# Patient Record
Sex: MALE | Race: WHITE | NOT HISPANIC OR LATINO | Employment: OTHER
[De-identification: names, ages, dates, MRNs, and addresses within clinical notes are randomized per-mention and may not be internally consistent; named-entity substitution may affect disease eponyms.]

---

## 2017-02-03 ENCOUNTER — RECORDS - HEALTHEAST (OUTPATIENT)
Dept: ADMINISTRATIVE | Facility: OTHER | Age: 82
End: 2017-02-03

## 2017-02-23 ENCOUNTER — COMMUNICATION - HEALTHEAST (OUTPATIENT)
Dept: INTERNAL MEDICINE | Facility: CLINIC | Age: 82
End: 2017-02-23

## 2017-02-23 ENCOUNTER — OFFICE VISIT - HEALTHEAST (OUTPATIENT)
Dept: INTERNAL MEDICINE | Facility: CLINIC | Age: 82
End: 2017-02-23

## 2017-02-23 DIAGNOSIS — M48.061 SPINAL STENOSIS OF LUMBAR REGION: ICD-10-CM

## 2017-02-23 DIAGNOSIS — Z01.818 PRE-OP EXAM: ICD-10-CM

## 2017-02-23 DIAGNOSIS — Z51.81 MEDICATION MONITORING ENCOUNTER: ICD-10-CM

## 2017-02-23 DIAGNOSIS — I25.84 CORONARY ATHEROSCLEROSIS DUE TO CALCIFIED CORONARY LESION: ICD-10-CM

## 2017-02-23 DIAGNOSIS — I25.10 CORONARY ATHEROSCLEROSIS DUE TO CALCIFIED CORONARY LESION: ICD-10-CM

## 2017-02-23 DIAGNOSIS — C61 PROSTATE CANCER (H): ICD-10-CM

## 2017-02-23 ASSESSMENT — MIFFLIN-ST. JEOR: SCORE: 1799.37

## 2017-02-28 LAB
ATRIAL RATE - MUSE: 59 BPM
DIASTOLIC BLOOD PRESSURE - MUSE: NORMAL MMHG
INTERPRETATION ECG - MUSE: NORMAL
P AXIS - MUSE: 76 DEGREES
PR INTERVAL - MUSE: 268 MS
QRS DURATION - MUSE: 80 MS
QT - MUSE: 380 MS
QTC - MUSE: 376 MS
R AXIS - MUSE: 35 DEGREES
SYSTOLIC BLOOD PRESSURE - MUSE: NORMAL MMHG
T AXIS - MUSE: 51 DEGREES
VENTRICULAR RATE- MUSE: 59 BPM

## 2017-03-16 ENCOUNTER — COMMUNICATION - HEALTHEAST (OUTPATIENT)
Dept: INTERNAL MEDICINE | Facility: CLINIC | Age: 82
End: 2017-03-16

## 2017-03-16 DIAGNOSIS — I10 HYPERTENSION: ICD-10-CM

## 2017-03-16 DIAGNOSIS — E78.00 HYPERCHOLESTEROLEMIA: ICD-10-CM

## 2017-03-16 DIAGNOSIS — N40.0 BPH (BENIGN PROSTATIC HYPERPLASIA): ICD-10-CM

## 2017-04-18 ENCOUNTER — RECORDS - HEALTHEAST (OUTPATIENT)
Dept: ADMINISTRATIVE | Facility: OTHER | Age: 82
End: 2017-04-18

## 2017-04-19 ENCOUNTER — RECORDS - HEALTHEAST (OUTPATIENT)
Dept: ADMINISTRATIVE | Facility: OTHER | Age: 82
End: 2017-04-19

## 2017-04-28 ENCOUNTER — RECORDS - HEALTHEAST (OUTPATIENT)
Dept: ADMINISTRATIVE | Facility: OTHER | Age: 82
End: 2017-04-28

## 2017-05-10 ENCOUNTER — OFFICE VISIT - HEALTHEAST (OUTPATIENT)
Dept: INTERNAL MEDICINE | Facility: CLINIC | Age: 82
End: 2017-05-10

## 2017-05-10 DIAGNOSIS — I25.10 CORONARY ATHEROSCLEROSIS DUE TO CALCIFIED CORONARY LESION: ICD-10-CM

## 2017-05-10 DIAGNOSIS — E78.00 PURE HYPERCHOLESTEROLEMIA: ICD-10-CM

## 2017-05-10 DIAGNOSIS — I10 ESSENTIAL HYPERTENSION: ICD-10-CM

## 2017-05-10 DIAGNOSIS — M48.00 SPINAL STENOSIS: ICD-10-CM

## 2017-05-10 DIAGNOSIS — C61 ADENOCARCINOMA OF PROSTATE (H): ICD-10-CM

## 2017-05-10 DIAGNOSIS — I25.84 CORONARY ATHEROSCLEROSIS DUE TO CALCIFIED CORONARY LESION: ICD-10-CM

## 2017-05-10 DIAGNOSIS — Z85.828 HISTORY OF SQUAMOUS CELL CARCINOMA OF SKIN: ICD-10-CM

## 2017-05-10 RX ORDER — NAPROXEN SODIUM 220 MG
220 TABLET ORAL PRN
Status: SHIPPED | COMMUNITY
Start: 2017-05-10 | End: 2022-04-04

## 2017-05-10 ASSESSMENT — MIFFLIN-ST. JEOR: SCORE: 1785.76

## 2017-06-13 ENCOUNTER — COMMUNICATION - HEALTHEAST (OUTPATIENT)
Dept: ADMINISTRATIVE | Facility: CLINIC | Age: 82
End: 2017-06-13

## 2017-06-23 ENCOUNTER — RECORDS - HEALTHEAST (OUTPATIENT)
Dept: ADMINISTRATIVE | Facility: OTHER | Age: 82
End: 2017-06-23

## 2017-06-28 ENCOUNTER — OFFICE VISIT - HEALTHEAST (OUTPATIENT)
Dept: AUDIOLOGY | Facility: CLINIC | Age: 82
End: 2017-06-28

## 2017-06-28 DIAGNOSIS — H90.3 SENSORINEURAL HEARING LOSS, BILATERAL: ICD-10-CM

## 2017-08-02 ENCOUNTER — OFFICE VISIT - HEALTHEAST (OUTPATIENT)
Dept: AUDIOLOGY | Facility: CLINIC | Age: 82
End: 2017-08-02

## 2017-08-02 DIAGNOSIS — H90.3 SENSORINEURAL HEARING LOSS, BILATERAL: ICD-10-CM

## 2017-08-10 ENCOUNTER — OFFICE VISIT - HEALTHEAST (OUTPATIENT)
Dept: AUDIOLOGY | Facility: CLINIC | Age: 82
End: 2017-08-10

## 2017-08-10 DIAGNOSIS — H90.3 SENSORINEURAL HEARING LOSS, BILATERAL: ICD-10-CM

## 2017-09-13 ENCOUNTER — OFFICE VISIT - HEALTHEAST (OUTPATIENT)
Dept: AUDIOLOGY | Facility: CLINIC | Age: 82
End: 2017-09-13

## 2017-09-13 DIAGNOSIS — H90.3 SENSORINEURAL HEARING LOSS, BILATERAL: ICD-10-CM

## 2017-10-24 ENCOUNTER — RECORDS - HEALTHEAST (OUTPATIENT)
Dept: ADMINISTRATIVE | Facility: OTHER | Age: 82
End: 2017-10-24

## 2017-11-06 ENCOUNTER — COMMUNICATION - HEALTHEAST (OUTPATIENT)
Dept: SURGERY | Facility: CLINIC | Age: 82
End: 2017-11-06

## 2017-11-07 ENCOUNTER — COMMUNICATION - HEALTHEAST (OUTPATIENT)
Dept: AUDIOLOGY | Facility: CLINIC | Age: 82
End: 2017-11-07

## 2017-11-09 ENCOUNTER — COMMUNICATION - HEALTHEAST (OUTPATIENT)
Dept: AUDIOLOGY | Facility: CLINIC | Age: 82
End: 2017-11-09

## 2017-11-13 ENCOUNTER — OFFICE VISIT - HEALTHEAST (OUTPATIENT)
Dept: INTERNAL MEDICINE | Facility: CLINIC | Age: 82
End: 2017-11-13

## 2017-11-13 DIAGNOSIS — M48.061 SPINAL STENOSIS OF LUMBAR REGION, UNSPECIFIED WHETHER NEUROGENIC CLAUDICATION PRESENT: ICD-10-CM

## 2017-11-13 DIAGNOSIS — E78.00 PURE HYPERCHOLESTEROLEMIA: ICD-10-CM

## 2017-11-13 DIAGNOSIS — Z85.828 HISTORY OF SQUAMOUS CELL CARCINOMA OF SKIN: ICD-10-CM

## 2017-11-13 DIAGNOSIS — I25.84 CORONARY ATHEROSCLEROSIS DUE TO CALCIFIED CORONARY LESION: ICD-10-CM

## 2017-11-13 DIAGNOSIS — I25.10 CORONARY ATHEROSCLEROSIS DUE TO CALCIFIED CORONARY LESION: ICD-10-CM

## 2017-11-13 DIAGNOSIS — C61 PROSTATE CANCER (H): ICD-10-CM

## 2017-11-13 DIAGNOSIS — Z51.81 MEDICATION MONITORING ENCOUNTER: ICD-10-CM

## 2017-11-13 LAB
CHOLEST SERPL-MCNC: 126 MG/DL
FASTING STATUS PATIENT QL REPORTED: YES
HDLC SERPL-MCNC: 51 MG/DL
LDLC SERPL CALC-MCNC: 57 MG/DL
TRIGL SERPL-MCNC: 91 MG/DL

## 2017-11-13 ASSESSMENT — MIFFLIN-ST. JEOR: SCORE: 1758.55

## 2017-11-14 ENCOUNTER — COMMUNICATION - HEALTHEAST (OUTPATIENT)
Dept: INTERNAL MEDICINE | Facility: CLINIC | Age: 82
End: 2017-11-14

## 2018-01-24 ENCOUNTER — COMMUNICATION - HEALTHEAST (OUTPATIENT)
Dept: SCHEDULING | Facility: CLINIC | Age: 83
End: 2018-01-24

## 2018-03-02 ENCOUNTER — COMMUNICATION - HEALTHEAST (OUTPATIENT)
Dept: INTERNAL MEDICINE | Facility: CLINIC | Age: 83
End: 2018-03-02

## 2018-03-02 DIAGNOSIS — E78.00 HYPERCHOLESTEROLEMIA: ICD-10-CM

## 2018-03-02 DIAGNOSIS — N40.0 BPH (BENIGN PROSTATIC HYPERPLASIA): ICD-10-CM

## 2018-03-02 DIAGNOSIS — I10 HYPERTENSION: ICD-10-CM

## 2018-04-11 ENCOUNTER — COMMUNICATION - HEALTHEAST (OUTPATIENT)
Dept: INTERNAL MEDICINE | Facility: CLINIC | Age: 83
End: 2018-04-11

## 2018-04-11 ENCOUNTER — OFFICE VISIT - HEALTHEAST (OUTPATIENT)
Dept: INTERNAL MEDICINE | Facility: CLINIC | Age: 83
End: 2018-04-11

## 2018-04-11 DIAGNOSIS — R09.82 POSTNASAL DRIP: ICD-10-CM

## 2018-04-11 DIAGNOSIS — R49.0 HOARSENESS: ICD-10-CM

## 2018-04-13 ENCOUNTER — RECORDS - HEALTHEAST (OUTPATIENT)
Dept: ADMINISTRATIVE | Facility: OTHER | Age: 83
End: 2018-04-13

## 2018-04-24 ENCOUNTER — RECORDS - HEALTHEAST (OUTPATIENT)
Dept: ADMINISTRATIVE | Facility: OTHER | Age: 83
End: 2018-04-24

## 2018-04-24 ENCOUNTER — COMMUNICATION - HEALTHEAST (OUTPATIENT)
Dept: INTERNAL MEDICINE | Facility: CLINIC | Age: 83
End: 2018-04-24

## 2018-04-24 DIAGNOSIS — I25.10 CORONARY ARTERY DISEASE DUE TO CALCIFIED CORONARY LESION: ICD-10-CM

## 2018-04-24 DIAGNOSIS — I25.84 CORONARY ARTERY DISEASE DUE TO CALCIFIED CORONARY LESION: ICD-10-CM

## 2018-04-25 ENCOUNTER — OFFICE VISIT - HEALTHEAST (OUTPATIENT)
Dept: INTERNAL MEDICINE | Facility: CLINIC | Age: 83
End: 2018-04-25

## 2018-04-25 DIAGNOSIS — E78.00 PURE HYPERCHOLESTEROLEMIA: ICD-10-CM

## 2018-04-25 DIAGNOSIS — I25.10 CORONARY ATHEROSCLEROSIS DUE TO CALCIFIED CORONARY LESION: ICD-10-CM

## 2018-04-25 DIAGNOSIS — Z85.828 HISTORY OF SQUAMOUS CELL CARCINOMA OF SKIN: ICD-10-CM

## 2018-04-25 DIAGNOSIS — C61 PROSTATE CANCER (H): ICD-10-CM

## 2018-04-25 DIAGNOSIS — I25.84 CORONARY ATHEROSCLEROSIS DUE TO CALCIFIED CORONARY LESION: ICD-10-CM

## 2018-04-25 DIAGNOSIS — I10 ESSENTIAL HYPERTENSION: ICD-10-CM

## 2018-04-25 ASSESSMENT — MIFFLIN-ST. JEOR: SCORE: 1740.4

## 2018-04-30 ENCOUNTER — RECORDS - HEALTHEAST (OUTPATIENT)
Dept: ADMINISTRATIVE | Facility: OTHER | Age: 83
End: 2018-04-30

## 2018-05-07 ENCOUNTER — RECORDS - HEALTHEAST (OUTPATIENT)
Dept: ADMINISTRATIVE | Facility: OTHER | Age: 83
End: 2018-05-07

## 2018-05-09 ENCOUNTER — RECORDS - HEALTHEAST (OUTPATIENT)
Dept: ADMINISTRATIVE | Facility: OTHER | Age: 83
End: 2018-05-09

## 2018-05-11 ENCOUNTER — AMBULATORY - HEALTHEAST (OUTPATIENT)
Dept: CARDIAC REHAB | Facility: HOSPITAL | Age: 83
End: 2018-05-11

## 2018-05-11 DIAGNOSIS — Z95.5 STENTED CORONARY ARTERY: ICD-10-CM

## 2018-05-12 ENCOUNTER — COMMUNICATION - HEALTHEAST (OUTPATIENT)
Dept: INTERNAL MEDICINE | Facility: CLINIC | Age: 83
End: 2018-05-12

## 2018-05-12 DIAGNOSIS — E78.00 HYPERCHOLESTEROLEMIA: ICD-10-CM

## 2018-05-15 ENCOUNTER — OFFICE VISIT - HEALTHEAST (OUTPATIENT)
Dept: INTERNAL MEDICINE | Facility: CLINIC | Age: 83
End: 2018-05-15

## 2018-05-15 DIAGNOSIS — C61 PROSTATE CANCER (H): ICD-10-CM

## 2018-05-15 DIAGNOSIS — Z51.81 MEDICATION MONITORING ENCOUNTER: ICD-10-CM

## 2018-05-15 DIAGNOSIS — I25.84 CORONARY ATHEROSCLEROSIS DUE TO CALCIFIED CORONARY LESION: ICD-10-CM

## 2018-05-15 DIAGNOSIS — E78.00 PURE HYPERCHOLESTEROLEMIA: ICD-10-CM

## 2018-05-15 DIAGNOSIS — I25.10 CORONARY ATHEROSCLEROSIS DUE TO CALCIFIED CORONARY LESION: ICD-10-CM

## 2018-05-15 LAB
ALBUMIN SERPL-MCNC: 3.6 G/DL (ref 3.5–5)
ALP SERPL-CCNC: 82 U/L (ref 45–120)
ALT SERPL W P-5'-P-CCNC: 15 U/L (ref 0–45)
ANION GAP SERPL CALCULATED.3IONS-SCNC: 8 MMOL/L (ref 5–18)
AST SERPL W P-5'-P-CCNC: 20 U/L (ref 0–40)
BILIRUB SERPL-MCNC: 0.8 MG/DL (ref 0–1)
BUN SERPL-MCNC: 19 MG/DL (ref 8–28)
CALCIUM SERPL-MCNC: 9.5 MG/DL (ref 8.5–10.5)
CHLORIDE BLD-SCNC: 104 MMOL/L (ref 98–107)
CHOLEST SERPL-MCNC: 115 MG/DL
CO2 SERPL-SCNC: 26 MMOL/L (ref 22–31)
CREAT SERPL-MCNC: 1.09 MG/DL (ref 0.7–1.3)
FASTING STATUS PATIENT QL REPORTED: YES
GFR SERPL CREATININE-BSD FRML MDRD: >60 ML/MIN/1.73M2
GLUCOSE BLD-MCNC: 103 MG/DL (ref 70–125)
HDLC SERPL-MCNC: 46 MG/DL
LDLC SERPL CALC-MCNC: 52 MG/DL
POTASSIUM BLD-SCNC: 4.6 MMOL/L (ref 3.5–5)
PROT SERPL-MCNC: 6.6 G/DL (ref 6–8)
SODIUM SERPL-SCNC: 138 MMOL/L (ref 136–145)
TRIGL SERPL-MCNC: 83 MG/DL

## 2018-05-15 ASSESSMENT — MIFFLIN-ST. JEOR: SCORE: 1735.87

## 2018-05-16 ENCOUNTER — COMMUNICATION - HEALTHEAST (OUTPATIENT)
Dept: INTERNAL MEDICINE | Facility: CLINIC | Age: 83
End: 2018-05-16

## 2018-05-16 ENCOUNTER — AMBULATORY - HEALTHEAST (OUTPATIENT)
Dept: CARDIAC REHAB | Facility: HOSPITAL | Age: 83
End: 2018-05-16

## 2018-05-16 DIAGNOSIS — Z95.5 STENTED CORONARY ARTERY: ICD-10-CM

## 2018-05-18 ENCOUNTER — RECORDS - HEALTHEAST (OUTPATIENT)
Dept: ADMINISTRATIVE | Facility: OTHER | Age: 83
End: 2018-05-18

## 2018-05-21 ENCOUNTER — COMMUNICATION - HEALTHEAST (OUTPATIENT)
Dept: INTERNAL MEDICINE | Facility: CLINIC | Age: 83
End: 2018-05-21

## 2018-05-21 ENCOUNTER — AMBULATORY - HEALTHEAST (OUTPATIENT)
Dept: CARDIAC REHAB | Facility: HOSPITAL | Age: 83
End: 2018-05-21

## 2018-05-21 DIAGNOSIS — I10 HYPERTENSION: ICD-10-CM

## 2018-05-21 DIAGNOSIS — N40.0 BPH (BENIGN PROSTATIC HYPERPLASIA): ICD-10-CM

## 2018-05-21 DIAGNOSIS — Z95.5 STENTED CORONARY ARTERY: ICD-10-CM

## 2018-05-23 ENCOUNTER — AMBULATORY - HEALTHEAST (OUTPATIENT)
Dept: CARDIAC REHAB | Facility: HOSPITAL | Age: 83
End: 2018-05-23

## 2018-05-23 DIAGNOSIS — Z95.5 STENTED CORONARY ARTERY: ICD-10-CM

## 2018-05-30 ENCOUNTER — AMBULATORY - HEALTHEAST (OUTPATIENT)
Dept: CARDIAC REHAB | Facility: HOSPITAL | Age: 83
End: 2018-05-30

## 2018-05-30 DIAGNOSIS — Z95.5 STENTED CORONARY ARTERY: ICD-10-CM

## 2018-06-04 ENCOUNTER — AMBULATORY - HEALTHEAST (OUTPATIENT)
Dept: CARDIAC REHAB | Facility: HOSPITAL | Age: 83
End: 2018-06-04

## 2018-06-04 DIAGNOSIS — Z95.5 STENTED CORONARY ARTERY: ICD-10-CM

## 2018-06-06 ENCOUNTER — AMBULATORY - HEALTHEAST (OUTPATIENT)
Dept: CARDIAC REHAB | Facility: HOSPITAL | Age: 83
End: 2018-06-06

## 2018-06-06 DIAGNOSIS — Z95.5 STENTED CORONARY ARTERY: ICD-10-CM

## 2018-06-11 ENCOUNTER — AMBULATORY - HEALTHEAST (OUTPATIENT)
Dept: CARDIAC REHAB | Facility: HOSPITAL | Age: 83
End: 2018-06-11

## 2018-06-11 DIAGNOSIS — Z95.5 STENTED CORONARY ARTERY: ICD-10-CM

## 2018-06-13 ENCOUNTER — AMBULATORY - HEALTHEAST (OUTPATIENT)
Dept: CARDIAC REHAB | Facility: HOSPITAL | Age: 83
End: 2018-06-13

## 2018-06-13 DIAGNOSIS — Z95.5 STENTED CORONARY ARTERY: ICD-10-CM

## 2018-06-18 ENCOUNTER — AMBULATORY - HEALTHEAST (OUTPATIENT)
Dept: CARDIAC REHAB | Facility: HOSPITAL | Age: 83
End: 2018-06-18

## 2018-06-18 ENCOUNTER — RECORDS - HEALTHEAST (OUTPATIENT)
Dept: ADMINISTRATIVE | Facility: OTHER | Age: 83
End: 2018-06-18

## 2018-06-18 DIAGNOSIS — Z95.5 STENTED CORONARY ARTERY: ICD-10-CM

## 2018-06-20 ENCOUNTER — AMBULATORY - HEALTHEAST (OUTPATIENT)
Dept: CARDIAC REHAB | Facility: HOSPITAL | Age: 83
End: 2018-06-20

## 2018-06-20 DIAGNOSIS — Z95.5 STENTED CORONARY ARTERY: ICD-10-CM

## 2018-06-21 ENCOUNTER — RECORDS - HEALTHEAST (OUTPATIENT)
Dept: ADMINISTRATIVE | Facility: OTHER | Age: 83
End: 2018-06-21

## 2018-06-25 ENCOUNTER — AMBULATORY - HEALTHEAST (OUTPATIENT)
Dept: CARDIAC REHAB | Facility: HOSPITAL | Age: 83
End: 2018-06-25

## 2018-06-25 DIAGNOSIS — Z95.5 STENTED CORONARY ARTERY: ICD-10-CM

## 2018-06-27 ENCOUNTER — AMBULATORY - HEALTHEAST (OUTPATIENT)
Dept: CARDIAC REHAB | Facility: HOSPITAL | Age: 83
End: 2018-06-27

## 2018-06-27 DIAGNOSIS — Z95.5 STENTED CORONARY ARTERY: ICD-10-CM

## 2018-07-02 ENCOUNTER — AMBULATORY - HEALTHEAST (OUTPATIENT)
Dept: CARDIAC REHAB | Facility: HOSPITAL | Age: 83
End: 2018-07-02

## 2018-07-02 DIAGNOSIS — Z95.5 STENTED CORONARY ARTERY: ICD-10-CM

## 2018-07-09 ENCOUNTER — AMBULATORY - HEALTHEAST (OUTPATIENT)
Dept: CARDIAC REHAB | Facility: HOSPITAL | Age: 83
End: 2018-07-09

## 2018-07-09 DIAGNOSIS — Z95.5 STENTED CORONARY ARTERY: ICD-10-CM

## 2018-07-11 ENCOUNTER — COMMUNICATION - HEALTHEAST (OUTPATIENT)
Dept: SURGERY | Facility: CLINIC | Age: 83
End: 2018-07-11

## 2018-07-11 ENCOUNTER — AMBULATORY - HEALTHEAST (OUTPATIENT)
Dept: CARDIAC REHAB | Facility: HOSPITAL | Age: 83
End: 2018-07-11

## 2018-07-11 DIAGNOSIS — Z95.5 STENTED CORONARY ARTERY: ICD-10-CM

## 2018-07-13 ENCOUNTER — COMMUNICATION - HEALTHEAST (OUTPATIENT)
Dept: AUDIOLOGY | Facility: CLINIC | Age: 83
End: 2018-07-13

## 2018-07-16 ENCOUNTER — AMBULATORY - HEALTHEAST (OUTPATIENT)
Dept: CARDIAC REHAB | Facility: HOSPITAL | Age: 83
End: 2018-07-16

## 2018-07-16 DIAGNOSIS — Z95.5 STENTED CORONARY ARTERY: ICD-10-CM

## 2018-07-18 ENCOUNTER — AMBULATORY - HEALTHEAST (OUTPATIENT)
Dept: CARDIAC REHAB | Facility: HOSPITAL | Age: 83
End: 2018-07-18

## 2018-07-18 DIAGNOSIS — Z95.5 STENTED CORONARY ARTERY: ICD-10-CM

## 2018-07-19 ENCOUNTER — OFFICE VISIT - HEALTHEAST (OUTPATIENT)
Dept: AUDIOLOGY | Facility: CLINIC | Age: 83
End: 2018-07-19

## 2018-07-19 ENCOUNTER — COMMUNICATION - HEALTHEAST (OUTPATIENT)
Dept: AUDIOLOGY | Facility: CLINIC | Age: 83
End: 2018-07-19

## 2018-07-19 DIAGNOSIS — H90.3 SENSORINEURAL HEARING LOSS, BILATERAL: ICD-10-CM

## 2018-07-23 ENCOUNTER — AMBULATORY - HEALTHEAST (OUTPATIENT)
Dept: CARDIAC REHAB | Facility: HOSPITAL | Age: 83
End: 2018-07-23

## 2018-07-23 DIAGNOSIS — Z95.5 STENTED CORONARY ARTERY: ICD-10-CM

## 2018-07-25 ENCOUNTER — AMBULATORY - HEALTHEAST (OUTPATIENT)
Dept: CARDIAC REHAB | Facility: HOSPITAL | Age: 83
End: 2018-07-25

## 2018-07-25 DIAGNOSIS — Z95.5 STENTED CORONARY ARTERY: ICD-10-CM

## 2018-07-30 ENCOUNTER — AMBULATORY - HEALTHEAST (OUTPATIENT)
Dept: CARDIAC REHAB | Facility: HOSPITAL | Age: 83
End: 2018-07-30

## 2018-07-30 DIAGNOSIS — Z95.5 STENTED CORONARY ARTERY: ICD-10-CM

## 2018-11-15 ENCOUNTER — OFFICE VISIT - HEALTHEAST (OUTPATIENT)
Dept: INTERNAL MEDICINE | Facility: CLINIC | Age: 83
End: 2018-11-15

## 2018-11-15 DIAGNOSIS — M48.061 SPINAL STENOSIS OF LUMBAR REGION, UNSPECIFIED WHETHER NEUROGENIC CLAUDICATION PRESENT: ICD-10-CM

## 2018-11-15 DIAGNOSIS — C61 PROSTATE CANCER (H): ICD-10-CM

## 2018-11-15 DIAGNOSIS — I10 ESSENTIAL HYPERTENSION: ICD-10-CM

## 2018-11-15 DIAGNOSIS — I25.10 CORONARY ATHEROSCLEROSIS DUE TO CALCIFIED CORONARY LESION: ICD-10-CM

## 2018-11-15 DIAGNOSIS — I25.84 CORONARY ATHEROSCLEROSIS DUE TO CALCIFIED CORONARY LESION: ICD-10-CM

## 2018-12-17 ENCOUNTER — RECORDS - HEALTHEAST (OUTPATIENT)
Dept: ADMINISTRATIVE | Facility: OTHER | Age: 83
End: 2018-12-17

## 2018-12-24 ENCOUNTER — RECORDS - HEALTHEAST (OUTPATIENT)
Dept: ADMINISTRATIVE | Facility: OTHER | Age: 83
End: 2018-12-24

## 2019-03-01 ENCOUNTER — COMMUNICATION - HEALTHEAST (OUTPATIENT)
Dept: INTERNAL MEDICINE | Facility: CLINIC | Age: 84
End: 2019-03-01

## 2019-03-01 DIAGNOSIS — R97.20 ELEVATED PROSTATE SPECIFIC ANTIGEN (PSA): ICD-10-CM

## 2019-03-01 DIAGNOSIS — I10 HYPERTENSION: ICD-10-CM

## 2019-03-07 ENCOUNTER — RECORDS - HEALTHEAST (OUTPATIENT)
Dept: ADMINISTRATIVE | Facility: OTHER | Age: 84
End: 2019-03-07

## 2019-03-15 ENCOUNTER — RECORDS - HEALTHEAST (OUTPATIENT)
Dept: ADMINISTRATIVE | Facility: OTHER | Age: 84
End: 2019-03-15

## 2019-04-16 ENCOUNTER — COMMUNICATION - HEALTHEAST (OUTPATIENT)
Dept: ADMINISTRATIVE | Facility: CLINIC | Age: 84
End: 2019-04-16

## 2019-04-24 ENCOUNTER — COMMUNICATION - HEALTHEAST (OUTPATIENT)
Dept: INTERNAL MEDICINE | Facility: CLINIC | Age: 84
End: 2019-04-24

## 2019-04-24 DIAGNOSIS — E78.00 HYPERCHOLESTEROLEMIA: ICD-10-CM

## 2019-05-20 ENCOUNTER — OFFICE VISIT - HEALTHEAST (OUTPATIENT)
Dept: INTERNAL MEDICINE | Facility: CLINIC | Age: 84
End: 2019-05-20

## 2019-05-20 DIAGNOSIS — Z51.81 MEDICATION MONITORING ENCOUNTER: ICD-10-CM

## 2019-05-20 DIAGNOSIS — C61 PROSTATE CANCER (H): ICD-10-CM

## 2019-05-20 DIAGNOSIS — I25.84 CORONARY ATHEROSCLEROSIS DUE TO CALCIFIED CORONARY LESION: ICD-10-CM

## 2019-05-20 DIAGNOSIS — I10 ESSENTIAL HYPERTENSION: ICD-10-CM

## 2019-05-20 DIAGNOSIS — Z00.00 HEALTHCARE MAINTENANCE: ICD-10-CM

## 2019-05-20 DIAGNOSIS — E78.00 PURE HYPERCHOLESTEROLEMIA: ICD-10-CM

## 2019-05-20 DIAGNOSIS — M48.061 SPINAL STENOSIS OF LUMBAR REGION, UNSPECIFIED WHETHER NEUROGENIC CLAUDICATION PRESENT: ICD-10-CM

## 2019-05-20 DIAGNOSIS — Z00.00 ROUTINE GENERAL MEDICAL EXAMINATION AT A HEALTH CARE FACILITY: ICD-10-CM

## 2019-05-20 DIAGNOSIS — I25.10 CORONARY ATHEROSCLEROSIS DUE TO CALCIFIED CORONARY LESION: ICD-10-CM

## 2019-05-20 DIAGNOSIS — Z85.828 HISTORY OF SQUAMOUS CELL CARCINOMA OF SKIN: ICD-10-CM

## 2019-05-20 LAB
ALBUMIN SERPL-MCNC: 3.7 G/DL (ref 3.5–5)
ALP SERPL-CCNC: 77 U/L (ref 45–120)
ALT SERPL W P-5'-P-CCNC: 16 U/L (ref 0–45)
ANION GAP SERPL CALCULATED.3IONS-SCNC: 8 MMOL/L (ref 5–18)
AST SERPL W P-5'-P-CCNC: 19 U/L (ref 0–40)
BILIRUB SERPL-MCNC: 0.7 MG/DL (ref 0–1)
BUN SERPL-MCNC: 21 MG/DL (ref 8–28)
CALCIUM SERPL-MCNC: 9.6 MG/DL (ref 8.5–10.5)
CHLORIDE BLD-SCNC: 106 MMOL/L (ref 98–107)
CHOLEST SERPL-MCNC: 115 MG/DL
CO2 SERPL-SCNC: 25 MMOL/L (ref 22–31)
CREAT SERPL-MCNC: 1.14 MG/DL (ref 0.7–1.3)
ERYTHROCYTE [DISTWIDTH] IN BLOOD BY AUTOMATED COUNT: 13.8 % (ref 11–14.5)
FASTING STATUS PATIENT QL REPORTED: YES
GFR SERPL CREATININE-BSD FRML MDRD: >60 ML/MIN/1.73M2
GLUCOSE BLD-MCNC: 98 MG/DL (ref 70–125)
HCT VFR BLD AUTO: 37.1 % (ref 40–54)
HDLC SERPL-MCNC: 53 MG/DL
HGB BLD-MCNC: 12.2 G/DL (ref 14–18)
LDLC SERPL CALC-MCNC: 46 MG/DL
MCH RBC QN AUTO: 28.8 PG (ref 27–34)
MCHC RBC AUTO-ENTMCNC: 32.8 G/DL (ref 32–36)
MCV RBC AUTO: 88 FL (ref 80–100)
PLATELET # BLD AUTO: 164 THOU/UL (ref 140–440)
PMV BLD AUTO: 7.6 FL (ref 7–10)
POTASSIUM BLD-SCNC: 4.5 MMOL/L (ref 3.5–5)
PROT SERPL-MCNC: 6.4 G/DL (ref 6–8)
RBC # BLD AUTO: 4.22 MILL/UL (ref 4.4–6.2)
SODIUM SERPL-SCNC: 139 MMOL/L (ref 136–145)
TRIGL SERPL-MCNC: 82 MG/DL
WBC: 4.7 THOU/UL (ref 4–11)

## 2019-05-20 ASSESSMENT — MIFFLIN-ST. JEOR: SCORE: 1703.54

## 2019-05-21 ENCOUNTER — COMMUNICATION - HEALTHEAST (OUTPATIENT)
Dept: INTERNAL MEDICINE | Facility: CLINIC | Age: 84
End: 2019-05-21

## 2019-06-14 ENCOUNTER — RECORDS - HEALTHEAST (OUTPATIENT)
Dept: ADMINISTRATIVE | Facility: OTHER | Age: 84
End: 2019-06-14

## 2019-09-17 ENCOUNTER — RECORDS - HEALTHEAST (OUTPATIENT)
Dept: ADMINISTRATIVE | Facility: OTHER | Age: 84
End: 2019-09-17

## 2019-11-04 ENCOUNTER — COMMUNICATION - HEALTHEAST (OUTPATIENT)
Dept: AUDIOLOGY | Facility: CLINIC | Age: 84
End: 2019-11-04

## 2019-11-04 ENCOUNTER — COMMUNICATION - HEALTHEAST (OUTPATIENT)
Dept: SURGERY | Facility: CLINIC | Age: 84
End: 2019-11-04

## 2019-11-07 ENCOUNTER — AMBULATORY - HEALTHEAST (OUTPATIENT)
Dept: AUDIOLOGY | Facility: CLINIC | Age: 84
End: 2019-11-07

## 2019-11-20 ENCOUNTER — COMMUNICATION - HEALTHEAST (OUTPATIENT)
Dept: INTERNAL MEDICINE | Facility: CLINIC | Age: 84
End: 2019-11-20

## 2019-11-20 ENCOUNTER — OFFICE VISIT - HEALTHEAST (OUTPATIENT)
Dept: INTERNAL MEDICINE | Facility: CLINIC | Age: 84
End: 2019-11-20

## 2019-11-20 DIAGNOSIS — Z85.828 HISTORY OF SQUAMOUS CELL CARCINOMA OF SKIN: ICD-10-CM

## 2019-11-20 DIAGNOSIS — I25.84 CORONARY ATHEROSCLEROSIS DUE TO CALCIFIED CORONARY LESION: ICD-10-CM

## 2019-11-20 DIAGNOSIS — E78.00 PURE HYPERCHOLESTEROLEMIA: ICD-10-CM

## 2019-11-20 DIAGNOSIS — I25.10 CORONARY ATHEROSCLEROSIS DUE TO CALCIFIED CORONARY LESION: ICD-10-CM

## 2019-11-20 DIAGNOSIS — C61 PROSTATE CANCER (H): ICD-10-CM

## 2019-11-20 DIAGNOSIS — I10 ESSENTIAL HYPERTENSION: ICD-10-CM

## 2019-11-20 DIAGNOSIS — Z51.81 MEDICATION MONITORING ENCOUNTER: ICD-10-CM

## 2019-11-20 DIAGNOSIS — M48.061 SPINAL STENOSIS OF LUMBAR REGION, UNSPECIFIED WHETHER NEUROGENIC CLAUDICATION PRESENT: ICD-10-CM

## 2019-11-20 LAB
ALBUMIN SERPL-MCNC: 3.8 G/DL (ref 3.5–5)
ALP SERPL-CCNC: 83 U/L (ref 45–120)
ALT SERPL W P-5'-P-CCNC: 19 U/L (ref 0–45)
ANION GAP SERPL CALCULATED.3IONS-SCNC: 9 MMOL/L (ref 5–18)
AST SERPL W P-5'-P-CCNC: 23 U/L (ref 0–40)
BILIRUB SERPL-MCNC: 0.8 MG/DL (ref 0–1)
BUN SERPL-MCNC: 26 MG/DL (ref 8–28)
CALCIUM SERPL-MCNC: 9.7 MG/DL (ref 8.5–10.5)
CHLORIDE BLD-SCNC: 106 MMOL/L (ref 98–107)
CHOLEST SERPL-MCNC: 122 MG/DL
CO2 SERPL-SCNC: 26 MMOL/L (ref 22–31)
CREAT SERPL-MCNC: 1.24 MG/DL (ref 0.7–1.3)
FASTING STATUS PATIENT QL REPORTED: YES
GFR SERPL CREATININE-BSD FRML MDRD: 55 ML/MIN/1.73M2
GLUCOSE BLD-MCNC: 101 MG/DL (ref 70–125)
HDLC SERPL-MCNC: 57 MG/DL
LDLC SERPL CALC-MCNC: 51 MG/DL
POTASSIUM BLD-SCNC: 4.8 MMOL/L (ref 3.5–5)
PROT SERPL-MCNC: 6.7 G/DL (ref 6–8)
SODIUM SERPL-SCNC: 141 MMOL/L (ref 136–145)
TRIGL SERPL-MCNC: 71 MG/DL

## 2019-11-20 RX ORDER — ASPIRIN 325 MG
325 TABLET ORAL EVERY OTHER DAY
Status: SHIPPED | COMMUNITY
Start: 2019-11-20 | End: 2022-09-26

## 2019-12-12 ENCOUNTER — COMMUNICATION - HEALTHEAST (OUTPATIENT)
Dept: INTERNAL MEDICINE | Facility: CLINIC | Age: 84
End: 2019-12-12

## 2019-12-12 DIAGNOSIS — I10 HYPERTENSION: ICD-10-CM

## 2019-12-12 DIAGNOSIS — R97.20 ELEVATED PROSTATE SPECIFIC ANTIGEN (PSA): ICD-10-CM

## 2020-01-09 ENCOUNTER — COMMUNICATION - HEALTHEAST (OUTPATIENT)
Dept: INTERNAL MEDICINE | Facility: CLINIC | Age: 85
End: 2020-01-09

## 2020-01-09 DIAGNOSIS — E78.00 HYPERCHOLESTEROLEMIA: ICD-10-CM

## 2020-01-26 ENCOUNTER — COMMUNICATION - HEALTHEAST (OUTPATIENT)
Dept: INTERNAL MEDICINE | Facility: CLINIC | Age: 85
End: 2020-01-26

## 2020-01-26 DIAGNOSIS — I25.10 CORONARY ATHEROSCLEROSIS DUE TO CALCIFIED CORONARY LESION: ICD-10-CM

## 2020-01-26 DIAGNOSIS — I25.84 CORONARY ATHEROSCLEROSIS DUE TO CALCIFIED CORONARY LESION: ICD-10-CM

## 2020-02-12 ENCOUNTER — COMMUNICATION - HEALTHEAST (OUTPATIENT)
Dept: SURGERY | Facility: CLINIC | Age: 85
End: 2020-02-12

## 2020-02-13 ENCOUNTER — COMMUNICATION - HEALTHEAST (OUTPATIENT)
Dept: AUDIOLOGY | Facility: CLINIC | Age: 85
End: 2020-02-13

## 2020-02-18 ENCOUNTER — COMMUNICATION - HEALTHEAST (OUTPATIENT)
Dept: AUDIOLOGY | Facility: CLINIC | Age: 85
End: 2020-02-18

## 2020-03-12 ENCOUNTER — RECORDS - HEALTHEAST (OUTPATIENT)
Dept: ADMINISTRATIVE | Facility: OTHER | Age: 85
End: 2020-03-12

## 2020-03-20 ENCOUNTER — RECORDS - HEALTHEAST (OUTPATIENT)
Dept: ADMINISTRATIVE | Facility: OTHER | Age: 85
End: 2020-03-20

## 2020-04-14 ENCOUNTER — COMMUNICATION - HEALTHEAST (OUTPATIENT)
Dept: INTERNAL MEDICINE | Facility: CLINIC | Age: 85
End: 2020-04-14

## 2020-04-14 DIAGNOSIS — J30.9 ALLERGIC RHINITIS: ICD-10-CM

## 2020-04-16 ENCOUNTER — RECORDS - HEALTHEAST (OUTPATIENT)
Dept: ADMINISTRATIVE | Facility: OTHER | Age: 85
End: 2020-04-16

## 2020-07-17 ENCOUNTER — RECORDS - HEALTHEAST (OUTPATIENT)
Dept: ADMINISTRATIVE | Facility: OTHER | Age: 85
End: 2020-07-17

## 2020-09-08 ENCOUNTER — RECORDS - HEALTHEAST (OUTPATIENT)
Dept: ADMINISTRATIVE | Facility: OTHER | Age: 85
End: 2020-09-08

## 2020-09-17 ENCOUNTER — OFFICE VISIT - HEALTHEAST (OUTPATIENT)
Dept: INTERNAL MEDICINE | Facility: CLINIC | Age: 85
End: 2020-09-17

## 2020-09-17 DIAGNOSIS — I25.84 CORONARY ATHEROSCLEROSIS DUE TO CALCIFIED CORONARY LESION: ICD-10-CM

## 2020-09-17 DIAGNOSIS — C61 PROSTATE CANCER (H): ICD-10-CM

## 2020-09-17 DIAGNOSIS — I10 HYPERTENSION: ICD-10-CM

## 2020-09-17 DIAGNOSIS — Z51.81 MEDICATION MONITORING ENCOUNTER: ICD-10-CM

## 2020-09-17 DIAGNOSIS — Z00.00 HEALTHCARE MAINTENANCE: ICD-10-CM

## 2020-09-17 DIAGNOSIS — Z00.00 ROUTINE GENERAL MEDICAL EXAMINATION AT A HEALTH CARE FACILITY: ICD-10-CM

## 2020-09-17 DIAGNOSIS — Z85.828 HISTORY OF SQUAMOUS CELL CARCINOMA OF SKIN: ICD-10-CM

## 2020-09-17 DIAGNOSIS — I25.10 CORONARY ATHEROSCLEROSIS DUE TO CALCIFIED CORONARY LESION: ICD-10-CM

## 2020-09-17 LAB
ALBUMIN SERPL-MCNC: 3.9 G/DL (ref 3.5–5)
ALP SERPL-CCNC: 82 U/L (ref 45–120)
ALT SERPL W P-5'-P-CCNC: 16 U/L (ref 0–45)
ANION GAP SERPL CALCULATED.3IONS-SCNC: 3 MMOL/L (ref 5–18)
AST SERPL W P-5'-P-CCNC: 21 U/L (ref 0–40)
BILIRUB SERPL-MCNC: 0.9 MG/DL (ref 0–1)
BUN SERPL-MCNC: 25 MG/DL (ref 8–28)
CALCIUM SERPL-MCNC: 9.6 MG/DL (ref 8.5–10.5)
CHLORIDE BLD-SCNC: 104 MMOL/L (ref 98–107)
CHOLEST SERPL-MCNC: 124 MG/DL
CO2 SERPL-SCNC: 32 MMOL/L (ref 22–31)
CREAT SERPL-MCNC: 1.22 MG/DL (ref 0.7–1.3)
ERYTHROCYTE [DISTWIDTH] IN BLOOD BY AUTOMATED COUNT: 13.8 % (ref 11–14.5)
FASTING STATUS PATIENT QL REPORTED: YES
GFR SERPL CREATININE-BSD FRML MDRD: 56 ML/MIN/1.73M2
GLUCOSE BLD-MCNC: 105 MG/DL (ref 70–125)
HCT VFR BLD AUTO: 41.6 % (ref 40–54)
HDLC SERPL-MCNC: 57 MG/DL
HGB BLD-MCNC: 13.6 G/DL (ref 14–18)
LDLC SERPL CALC-MCNC: 53 MG/DL
MCH RBC QN AUTO: 29.3 PG (ref 27–34)
MCHC RBC AUTO-ENTMCNC: 32.6 G/DL (ref 32–36)
MCV RBC AUTO: 90 FL (ref 80–100)
PLATELET # BLD AUTO: 152 THOU/UL (ref 140–440)
PMV BLD AUTO: 7.6 FL (ref 7–10)
POTASSIUM BLD-SCNC: 4.9 MMOL/L (ref 3.5–5)
PROT SERPL-MCNC: 6.8 G/DL (ref 6–8)
RBC # BLD AUTO: 4.62 MILL/UL (ref 4.4–6.2)
SODIUM SERPL-SCNC: 139 MMOL/L (ref 136–145)
TRIGL SERPL-MCNC: 72 MG/DL
WBC: 3.8 THOU/UL (ref 4–11)

## 2020-09-17 ASSESSMENT — MIFFLIN-ST. JEOR: SCORE: 1706.5

## 2020-09-30 ENCOUNTER — COMMUNICATION - HEALTHEAST (OUTPATIENT)
Dept: AUDIOLOGY | Facility: CLINIC | Age: 85
End: 2020-09-30

## 2020-10-07 ENCOUNTER — COMMUNICATION - HEALTHEAST (OUTPATIENT)
Dept: AUDIOLOGY | Facility: CLINIC | Age: 85
End: 2020-10-07

## 2020-10-18 ENCOUNTER — AMBULATORY - HEALTHEAST (OUTPATIENT)
Dept: NURSING | Facility: CLINIC | Age: 85
End: 2020-10-18

## 2020-10-27 ENCOUNTER — AMBULATORY - HEALTHEAST (OUTPATIENT)
Dept: AUDIOLOGY | Facility: CLINIC | Age: 85
End: 2020-10-27

## 2020-11-12 ENCOUNTER — COMMUNICATION - HEALTHEAST (OUTPATIENT)
Dept: INTERNAL MEDICINE | Facility: CLINIC | Age: 85
End: 2020-11-12

## 2020-11-12 ENCOUNTER — OFFICE VISIT - HEALTHEAST (OUTPATIENT)
Dept: AUDIOLOGY | Facility: CLINIC | Age: 85
End: 2020-11-12

## 2020-11-12 DIAGNOSIS — H90.3 SENSORINEURAL HEARING LOSS, BILATERAL: ICD-10-CM

## 2020-11-12 DIAGNOSIS — R97.20 ELEVATED PROSTATE SPECIFIC ANTIGEN (PSA): ICD-10-CM

## 2020-11-12 DIAGNOSIS — I10 HYPERTENSION: ICD-10-CM

## 2020-12-01 ENCOUNTER — COMMUNICATION - HEALTHEAST (OUTPATIENT)
Dept: AUDIOLOGY | Facility: CLINIC | Age: 85
End: 2020-12-01

## 2020-12-09 ENCOUNTER — RECORDS - HEALTHEAST (OUTPATIENT)
Dept: ADMINISTRATIVE | Facility: OTHER | Age: 85
End: 2020-12-09

## 2021-02-01 ENCOUNTER — COMMUNICATION - HEALTHEAST (OUTPATIENT)
Dept: INTERNAL MEDICINE | Facility: CLINIC | Age: 86
End: 2021-02-01

## 2021-02-01 DIAGNOSIS — I10 HYPERTENSION: ICD-10-CM

## 2021-02-01 DIAGNOSIS — R97.20 ELEVATED PROSTATE SPECIFIC ANTIGEN (PSA): ICD-10-CM

## 2021-02-02 RX ORDER — TAMSULOSIN HYDROCHLORIDE 0.4 MG/1
CAPSULE ORAL
Qty: 90 CAPSULE | Refills: 1 | Status: SHIPPED | OUTPATIENT
Start: 2021-02-02 | End: 2022-03-16

## 2021-02-03 ENCOUNTER — OFFICE VISIT - HEALTHEAST (OUTPATIENT)
Dept: INTERNAL MEDICINE | Facility: CLINIC | Age: 86
End: 2021-02-03

## 2021-02-03 DIAGNOSIS — I25.10 CORONARY ATHEROSCLEROSIS DUE TO CALCIFIED CORONARY LESION: ICD-10-CM

## 2021-02-03 DIAGNOSIS — R22.42 LOCALIZED SWELLING OF LEFT LOWER LEG: ICD-10-CM

## 2021-02-03 DIAGNOSIS — I25.84 CORONARY ATHEROSCLEROSIS DUE TO CALCIFIED CORONARY LESION: ICD-10-CM

## 2021-02-19 ENCOUNTER — AMBULATORY - HEALTHEAST (OUTPATIENT)
Dept: NURSING | Facility: CLINIC | Age: 86
End: 2021-02-19

## 2021-02-24 ENCOUNTER — COMMUNICATION - HEALTHEAST (OUTPATIENT)
Dept: INTERNAL MEDICINE | Facility: CLINIC | Age: 86
End: 2021-02-24

## 2021-02-24 DIAGNOSIS — E78.00 HYPERCHOLESTEROLEMIA: ICD-10-CM

## 2021-02-24 RX ORDER — ATORVASTATIN CALCIUM 20 MG
20 TABLET ORAL DAILY
Qty: 90 TABLET | Refills: 3 | Status: SHIPPED | OUTPATIENT
Start: 2021-02-24 | End: 2021-12-07

## 2021-02-25 ENCOUNTER — COMMUNICATION - HEALTHEAST (OUTPATIENT)
Dept: INTERNAL MEDICINE | Facility: CLINIC | Age: 86
End: 2021-02-25

## 2021-02-25 DIAGNOSIS — I25.84 CORONARY ATHEROSCLEROSIS DUE TO CALCIFIED CORONARY LESION: ICD-10-CM

## 2021-02-25 DIAGNOSIS — I25.10 CORONARY ATHEROSCLEROSIS DUE TO CALCIFIED CORONARY LESION: ICD-10-CM

## 2021-03-12 ENCOUNTER — AMBULATORY - HEALTHEAST (OUTPATIENT)
Dept: NURSING | Facility: CLINIC | Age: 86
End: 2021-03-12

## 2021-03-26 ENCOUNTER — OFFICE VISIT - HEALTHEAST (OUTPATIENT)
Dept: INTERNAL MEDICINE | Facility: CLINIC | Age: 86
End: 2021-03-26

## 2021-03-26 DIAGNOSIS — C61 PROSTATE CANCER (H): ICD-10-CM

## 2021-03-26 DIAGNOSIS — I25.10 CORONARY ATHEROSCLEROSIS DUE TO CALCIFIED CORONARY LESION: ICD-10-CM

## 2021-03-26 DIAGNOSIS — G89.29 CHRONIC BILATERAL LOW BACK PAIN WITHOUT SCIATICA: ICD-10-CM

## 2021-03-26 DIAGNOSIS — Z51.81 ENCOUNTER FOR THERAPEUTIC DRUG MONITORING: ICD-10-CM

## 2021-03-26 DIAGNOSIS — I25.84 CORONARY ATHEROSCLEROSIS DUE TO CALCIFIED CORONARY LESION: ICD-10-CM

## 2021-03-26 DIAGNOSIS — R60.0 BILATERAL LOWER EXTREMITY EDEMA: ICD-10-CM

## 2021-03-26 DIAGNOSIS — M54.50 CHRONIC BILATERAL LOW BACK PAIN WITHOUT SCIATICA: ICD-10-CM

## 2021-03-26 LAB
ALBUMIN SERPL-MCNC: 3.9 G/DL (ref 3.5–5)
ALP SERPL-CCNC: 91 U/L (ref 45–120)
ALT SERPL W P-5'-P-CCNC: 12 U/L (ref 0–45)
ANION GAP SERPL CALCULATED.3IONS-SCNC: 8 MMOL/L (ref 5–18)
AST SERPL W P-5'-P-CCNC: 21 U/L (ref 0–40)
BILIRUB SERPL-MCNC: 0.8 MG/DL (ref 0–1)
BUN SERPL-MCNC: 23 MG/DL (ref 8–28)
CALCIUM SERPL-MCNC: 9.2 MG/DL (ref 8.5–10.5)
CHLORIDE BLD-SCNC: 106 MMOL/L (ref 98–107)
CO2 SERPL-SCNC: 25 MMOL/L (ref 22–31)
CREAT SERPL-MCNC: 1.07 MG/DL (ref 0.7–1.3)
GFR SERPL CREATININE-BSD FRML MDRD: >60 ML/MIN/1.73M2
GLUCOSE BLD-MCNC: 106 MG/DL (ref 70–125)
POTASSIUM BLD-SCNC: 4.8 MMOL/L (ref 3.5–5)
PROT SERPL-MCNC: 6.6 G/DL (ref 6–8)
SODIUM SERPL-SCNC: 139 MMOL/L (ref 136–145)

## 2021-03-26 ASSESSMENT — MIFFLIN-ST. JEOR: SCORE: 1722.83

## 2021-03-29 ENCOUNTER — COMMUNICATION - HEALTHEAST (OUTPATIENT)
Dept: INTERNAL MEDICINE | Facility: CLINIC | Age: 86
End: 2021-03-29

## 2021-04-19 ENCOUNTER — OFFICE VISIT - HEALTHEAST (OUTPATIENT)
Dept: INTERNAL MEDICINE | Facility: CLINIC | Age: 86
End: 2021-04-19

## 2021-04-19 DIAGNOSIS — R60.0 BILATERAL LOWER EXTREMITY EDEMA: ICD-10-CM

## 2021-04-19 DIAGNOSIS — I25.10 CORONARY ATHEROSCLEROSIS DUE TO CALCIFIED CORONARY LESION: ICD-10-CM

## 2021-04-19 DIAGNOSIS — Z85.46 HISTORY OF PROSTATE CANCER: ICD-10-CM

## 2021-04-19 DIAGNOSIS — Z51.81 ENCOUNTER FOR THERAPEUTIC DRUG MONITORING: ICD-10-CM

## 2021-04-19 DIAGNOSIS — I25.84 CORONARY ATHEROSCLEROSIS DUE TO CALCIFIED CORONARY LESION: ICD-10-CM

## 2021-04-19 LAB
ANION GAP SERPL CALCULATED.3IONS-SCNC: 10 MMOL/L (ref 5–18)
BUN SERPL-MCNC: 24 MG/DL (ref 8–28)
CALCIUM SERPL-MCNC: 9.3 MG/DL (ref 8.5–10.5)
CHLORIDE BLD-SCNC: 106 MMOL/L (ref 98–107)
CO2 SERPL-SCNC: 24 MMOL/L (ref 22–31)
CREAT SERPL-MCNC: 1.13 MG/DL (ref 0.7–1.3)
GFR SERPL CREATININE-BSD FRML MDRD: >60 ML/MIN/1.73M2
GLUCOSE BLD-MCNC: 104 MG/DL (ref 70–125)
POTASSIUM BLD-SCNC: 4.8 MMOL/L (ref 3.5–5)
SODIUM SERPL-SCNC: 140 MMOL/L (ref 136–145)

## 2021-04-19 RX ORDER — FUROSEMIDE 20 MG
20 TABLET ORAL DAILY
Qty: 90 TABLET | Refills: 2 | Status: SHIPPED | OUTPATIENT
Start: 2021-04-19 | End: 2021-12-07

## 2021-04-20 ENCOUNTER — COMMUNICATION - HEALTHEAST (OUTPATIENT)
Dept: INTERNAL MEDICINE | Facility: CLINIC | Age: 86
End: 2021-04-20

## 2021-05-07 ENCOUNTER — COMMUNICATION - HEALTHEAST (OUTPATIENT)
Dept: INTERNAL MEDICINE | Facility: CLINIC | Age: 86
End: 2021-05-07

## 2021-05-07 DIAGNOSIS — I25.10 CORONARY ATHEROSCLEROSIS DUE TO CALCIFIED CORONARY LESION: ICD-10-CM

## 2021-05-07 DIAGNOSIS — I25.84 CORONARY ATHEROSCLEROSIS DUE TO CALCIFIED CORONARY LESION: ICD-10-CM

## 2021-05-10 RX ORDER — NITROGLYCERIN 0.4 MG/1
TABLET SUBLINGUAL
Qty: 50 TABLET | Refills: 3 | Status: SHIPPED | OUTPATIENT
Start: 2021-05-10 | End: 2022-07-07

## 2021-05-27 NOTE — TELEPHONE ENCOUNTER
Please send pt rechargeable hearing aid batteries to:  [address]  [facility] [address]

## 2021-05-28 NOTE — PROGRESS NOTES
Assessment and Plan:       1. Healthcare maintenance  Continue walking on treadmill 4 times a week or more.    History of colon polyp, 5 years since his last colonoscopy.  His bowels are normal and no blood in stool.  With history of MI 1 year ago and multiple drug-eluting stents and age of 85, I did discuss with patient not proceeding with colonoscopy.  I did discuss risk of undiagnosed colon cancer.  Patient accepts this risk and he does not wish to proceed with colonoscopy screening at this time anymore.    He was reminded to make his routine yearly ophthalmology appointment in August.    Discussed CODE STATUS, still full code, but he would not want prolonged artificial life support if catastrophic event with poor prognosis.  He has discussed this with family.    Follow-up with me in 6 months for routine follow-up of multiple medical issues below.    2. Coronary atherosclerosis due to calcified coronary lesion  Asymptomatic with good exercise tolerance on treadmill.    Is one year since his non-Q wave MI with drug-eluting stents placed.  He should be able to come off the Effient at this time.  I did discuss with him discontinuation of Effient and continuation of aspirin.    He wants to discuss this with cardiology but does not have an appointment set up yet.  Referral is been placed for him to make an appointment.    He will continue on Lipitor 20 mg.  - Ambulatory referral to Cardiology    3. Essential hypertension  Controlled.  Blood pressure usually running in the 120s over 60s for him at home but no orthostasis.  Continue on atenolol 25 mg a day.    4. Prostate cancer (H)  I did review the biopsy report from March 2019.  It was a 3+3 over relatively small area.  That stable.  His PSA was only up slightly to 9.08 in December.  6-month follow-up PSA check this October, continue watchful waiting.  Urology manages prostate.    Urinating adequately with Flomax.    5. Spinal stenosis of lumbar region, unspecified  whether neurogenic claudication present  Decompressive laminectomy in 2017 at L2-L5.  Continue regular walking on treadmill.  Uses cane.  No falls.  No pain medicine needed.    6. Essential Hypercholesterolemia  Lipitor 20 mg.  Goal LDL less than 80 with coronary disease.  No sign of myopathy.  - Lipid Cascade    7. Medication monitoring encounter    - Comprehensive Metabolic Panel  - HM2(CBC w/o Differential)    8. History of squamous cell carcinoma of skin  Has an appoint with dermatology in June.  Sclerotic lesion on scalp discussed with patient he will discuss that with cardiology.    9. Routine general medical examination at a health care facility  Mild lower extremity edema consistent with venous insufficiency at trace level, continues.  I did discuss option for compression stockings patient can wear.  Follow-up if that worsens.    History of mild COPD, no new cough or increasing shortness of breath.    The patient's current medical problems were reviewed.    I have had an Advance Directives discussion with the patient.  The following health maintenance schedule was reviewed with the patient and provided in printed form in the after visit summary:   Health Maintenance   Topic Date Due     ZOSTER VACCINES (2 of 3) 03/18/2009     FALL RISK ASSESSMENT  04/25/2019     ADVANCE DIRECTIVES DISCUSSED WITH PATIENT  02/22/2022     TD 18+ HE  07/25/2022     PNEUMOCOCCAL POLYSACCHARIDE VACCINE AGE 65 AND OVER  Completed     INFLUENZA VACCINE RULE BASED  Completed     PNEUMOCOCCAL CONJUGATE VACCINE FOR ADULTS (PCV13 OR PREVNAR)  Completed        Subjective:   Chief Complaint: Scott Cehrry is an 85 y.o. male here for an Annual Wellness visit.   HPI: 85-year-old male here for health maintenance physical exam.    He also has a past history of coronary artery disease.  Non-Q wave MI April 2018 with 2 drug-eluting stents of the LAD, one BRITTANEY of the PDA.  One BRITTANEY to the circumflex.  PTCA of the diagonal and OM.    No chest  pain or exertional symptoms since then.    May 2018 echo with ejection fraction 55 days 60%.  Mild aortic insufficiency.  Grade 1 diastolic dysfunction.    He does have some mild lower extremity edema but that stable at trace level.  No worsening shortness of breath.  He has mild COPD.  No longer a smoker.    No new cough or change in cough.  No mouth sores or swallowing difficulty.    His blood pressure at home is been in the 120s over 60s with no orthostasis.  On atenolol 25 mill grams a day.    I did review the oncology notes from December in March.  PSA stable as above.  Biopsy 3+3 and stable, as noted above.    History of skin cancer, he had a sclerotic area on his scalp that he has been scratching and otherwise has not noticed new skin lesions.  He does have an appointment next month with his dermatologist.    His bowels are normal.  He did have a previous colon polyp.  5-year follow-up was due.    No vision changes or new headaches.  His yearly eye check will be due in August.    No falls.    Only rare alcohol.    Lives independently at home with wife.  No mood or anxiety issues or memory problems.    Review of Systems:    Please see above.  The rest of the review of systems are negative for all systems.    Patient Care Team:  Alejandro Redd MD as PCP - General     Patient Active Problem List   Diagnosis     Foot Pain (Soft Tissue)     Essential Hypercholesterolemia     Coronary Artery Disease     Serology Prostate-specific Antigen (PSA) Elevated     Cough     Acute Gingivitis     Aphthous Ulcer     Benign Adenomatous Polyp Of The Large Intestine     Unspecified cataract     Prostate cancer (H)     Spinal stenosis, lumbar     Medication monitoring encounter     History of squamous cell carcinoma of skin     Sensorineural hearing loss, bilateral     Past Medical History:   Diagnosis Date     Adenomatous colon polyp     in 2014, 5 year follow up     BPH (benign prostatic hyperplasia)     on flomax     CAD  (coronary artery disease)     BMS LAD '01/ neg stress test '07/ no h/o MI     COPD (chronic obstructive pulmonary disease) (H)     mild/ former smoker     History of basal cell carcinoma of skin     yearly exam in Sept.     HTN (hypertension)      Hypercholesteremia     controlled on lipitor     Prostate cancer (H)     heriberto 3+3 on 4/14 bx.  No Tx yet, watchful waiting.      Spinal stenosis     severe L3-4, mod L4-5      No past surgical history on file.   No family history on file.   Social History     Socioeconomic History     Marital status:      Spouse name: Not on file     Number of children: Not on file     Years of education: Not on file     Highest education level: Not on file   Occupational History     Not on file   Social Needs     Financial resource strain: Not on file     Food insecurity:     Worry: Not on file     Inability: Not on file     Transportation needs:     Medical: Not on file     Non-medical: Not on file   Tobacco Use     Smoking status: Former Smoker     Smokeless tobacco: Never Used   Substance and Sexual Activity     Alcohol use: Not on file     Drug use: Not on file     Sexual activity: Not on file   Lifestyle     Physical activity:     Days per week: Not on file     Minutes per session: Not on file     Stress: Not on file   Relationships     Social connections:     Talks on phone: Not on file     Gets together: Not on file     Attends Taoist service: Not on file     Active member of club or organization: Not on file     Attends meetings of clubs or organizations: Not on file     Relationship status: Not on file     Intimate partner violence:     Fear of current or ex partner: Not on file     Emotionally abused: Not on file     Physically abused: Not on file     Forced sexual activity: Not on file   Other Topics Concern     Not on file   Social History Narrative     Not on file      Current Outpatient Medications   Medication Sig Dispense Refill     ascorbic acid, vitamin C,  "(ASCORBIC ACID WITH HERMILA HIPS) 500 MG tablet Take 500 mg by mouth daily.       aspirin 81 MG EC tablet Take 81 mg by mouth.       atenolol (TENORMIN) 25 MG tablet TAKE 1 TABLET BY MOUTH  DAILY 90 tablet 2     cholecalciferol, vitamin D3, (VITAMIN D3) 1,000 unit capsule Take 1,000 Units by mouth daily.              fish oil-dha-epa 1,200-144-216 mg cap Take 1,000 mg by mouth daily .             fluticasone (FLONASE) 50 mcg/actuation nasal spray Use 1 spray nasally two  times daily as needed for  hay fever 48 g 11     FOLIC ACID/MULTIVITS-MIN/LUT (CENTRUM SILVER ORAL) Take 1 tablet by mouth daily.       LIPITOR 20 mg tablet TAKE 1 TABLET BY MOUTH  DAILY 90 tablet 2     naproxen sodium (ALEVE) 220 MG tablet Take 220 mg by mouth as needed .             nitroglycerin (NITROSTAT) 0.4 MG SL tablet Place 0.4 mg under the tongue.       prasugrel (EFFIENT) 10 mg Tab tablet Take 10 mg by mouth daily.              tamsulosin (FLOMAX) 0.4 mg cap TAKE 1 CAPSULE BY MOUTH AT  BEDTIME 90 capsule 2     zinc 50 mg Tab Take 1 tablet by mouth daily.       No current facility-administered medications for this visit.       Objective:   Vital Signs:   Visit Vitals  /76 (Patient Site: Right Arm, Patient Position: Sitting, Cuff Size: Adult Large)   Pulse 60   Resp 12   Ht 5' 9.25\" (1.759 m)   Wt (!) 228 lb (103.4 kg)   BMI 33.43 kg/m         VisionScreening:  No exam data present     PHYSICAL EXAM  Alert and oriented x3 with normal mood and affect.  Pupils and irises equal and reactive.  Extraocular muscles intact.  No jaundice or conjunctivitis.  Tympanic membranes are normal.  No cerumen impaction.  Normal pharynx.  No pharyngitis.  No oral lesions or leukoplakia.  Teeth in good condition.  No cervical or supraclavicular adenopathy.  No JVD and no carotid bruits.  No thyromegaly or nodularity.  Lungs are clear to auscultation with normal respiratory excursion.  Heart is regular with no murmur.  Trace ankle edema bilaterally.  +1 " pedal pulses.  Feet in good condition.  Gait within normal limits and able to clamp on the exam table.  Abdomen is nontender, just mildly overweight.  No hepatospleno megaly and no pulsatile mass.   exam was done by his urologist recently.  Skin exam shows a sclerotic area in the scalp that he has been scratching it, no other suspicious lesions.  A number of seborrheic keratosis.    Assessment Results 5/20/2019   Activities of Daily Living No help needed   Instrumental Activities of Daily Living No help needed   Mini Cog Total Score 5   Some recent data might be hidden     A Mini-Cog score of 0-2 suggests the possibility of dementia, score of 3-5 suggests no dementia    Identified Health Risks:     Information regarding advance directives (living sims), including where he can download the appropriate form, was provided to the patient via the AVS.

## 2021-05-28 NOTE — TELEPHONE ENCOUNTER
Patient calling requesting an update re: hearing aids.     Please call patient on his cell phone with an update if they have arrived at: [phone]    OK to leave detailed message

## 2021-05-28 NOTE — TELEPHONE ENCOUNTER
Refill Approved    Rx renewed per Medication Renewal Policy. Medication was last renewed on 5/12/18  #90 R-3.    Last OV 11/15/18    Peg Kebede, [facility] Triage/Med Refill 4/26/2019     Requested Prescriptions   Pending Prescriptions Disp Refills     LIPITOR 20 mg tablet [Pharmacy Med Name: LIPITOR  20MG  TAB] 90 tablet 3     Sig: TAKE 1 TABLET BY MOUTH  DAILY       Statins Refill Protocol (Hmg CoA Reductase Inhibitors) Passed - 4/24/2019  3:37 AM        Passed - PCP or prescribing provider visit in past 12 months      Last office visit with prescriber/PCP: 11/15/2018 Alejandro Redd MD OR same dept: 11/15/2018 Alejandro Redd MD OR same specialty: 11/15/2018 Alejandro Redd MD  Last physical: 2/23/2017 Last MTM visit: Visit date not found   Next visit within 3 mo: Visit date not found  Next physical within 3 mo: Visit date not found  Prescriber OR PCP: Alejandro Redd MD  Last diagnosis associated with med order: 1. Hypercholesterolemia  - LIPITOR 20 mg tablet [Pharmacy Med Name: LIPITOR  20MG  TAB]; TAKE 1 TABLET BY MOUTH  DAILY  Dispense: 90 tablet; Refill: 3    If protocol passes may refill for 12 months if within 3 months of last provider visit (or a total of 15 months).

## 2021-05-30 VITALS — WEIGHT: 250 LBS | BODY MASS INDEX: 37.03 KG/M2 | HEIGHT: 69 IN

## 2021-05-31 VITALS — HEIGHT: 69 IN | BODY MASS INDEX: 36.58 KG/M2 | WEIGHT: 247 LBS

## 2021-05-31 VITALS — BODY MASS INDEX: 35.7 KG/M2 | WEIGHT: 241 LBS | HEIGHT: 69 IN

## 2021-06-01 VITALS — BODY MASS INDEX: 33.97 KG/M2 | WEIGHT: 230 LBS

## 2021-06-01 VITALS — WEIGHT: 235 LBS | BODY MASS INDEX: 34.7 KG/M2

## 2021-06-01 VITALS — HEIGHT: 69 IN | BODY MASS INDEX: 35.1 KG/M2 | WEIGHT: 237 LBS

## 2021-06-01 VITALS — WEIGHT: 229 LBS | BODY MASS INDEX: 33.82 KG/M2

## 2021-06-01 VITALS — WEIGHT: 242 LBS | BODY MASS INDEX: 35.74 KG/M2

## 2021-06-01 VITALS — WEIGHT: 225 LBS | BODY MASS INDEX: 33.23 KG/M2

## 2021-06-01 VITALS — WEIGHT: 234 LBS | BODY MASS INDEX: 34.56 KG/M2

## 2021-06-01 VITALS — WEIGHT: 230 LBS | BODY MASS INDEX: 33.97 KG/M2

## 2021-06-01 VITALS — HEIGHT: 69 IN | WEIGHT: 236 LBS | BODY MASS INDEX: 34.96 KG/M2

## 2021-06-01 VITALS — BODY MASS INDEX: 33.37 KG/M2 | WEIGHT: 226 LBS

## 2021-06-01 VITALS — BODY MASS INDEX: 33.23 KG/M2 | WEIGHT: 225 LBS

## 2021-06-01 VITALS — WEIGHT: 237 LBS | BODY MASS INDEX: 35 KG/M2

## 2021-06-01 VITALS — WEIGHT: 233 LBS | BODY MASS INDEX: 34.41 KG/M2

## 2021-06-01 VITALS — BODY MASS INDEX: 34.41 KG/M2 | WEIGHT: 233 LBS

## 2021-06-01 VITALS — BODY MASS INDEX: 33.52 KG/M2 | WEIGHT: 227 LBS

## 2021-06-01 VITALS — WEIGHT: 236 LBS | BODY MASS INDEX: 34.85 KG/M2

## 2021-06-01 VITALS — BODY MASS INDEX: 34.26 KG/M2 | WEIGHT: 232 LBS

## 2021-06-01 VITALS — BODY MASS INDEX: 34.11 KG/M2 | WEIGHT: 231 LBS

## 2021-06-02 VITALS — WEIGHT: 225.9 LBS | BODY MASS INDEX: 33.36 KG/M2

## 2021-06-03 VITALS — WEIGHT: 228 LBS | BODY MASS INDEX: 33.77 KG/M2 | HEIGHT: 69 IN

## 2021-06-03 NOTE — TELEPHONE ENCOUNTER
Rechargeable batteries are not lasting more than 8 hours per day. [employer] will send replacement batteries under warranty at no charge.    Batteries will be mailed to Scott Cherry's home once received from .    Ramin aGrcia.  Clinical Audiologist  [place] #[id number]

## 2021-06-03 NOTE — PROGRESS NOTES
A pair of [employer] rechargeable 312 batteries mailed to patient via certified mail. Tracking #: [id number].     Ramin Prieto, CCC-A  [place] Licensed Audiologist #[id number]

## 2021-06-03 NOTE — PATIENT INSTRUCTIONS - HE
Continue current medications.    Continue with regular walking.    Nitroglycerin was refilled, but if you do have new chest pain or start taking nitroglycerin, seek medical attention immediately.    Routine follow-up in 6 months for adult wellness visit.

## 2021-06-03 NOTE — PROGRESS NOTES
[facility] Follow Up Note    Scott Cherry   85 y.o. male    Date of Visit: 11/20/2019    Chief Complaint   Patient presents with     Follow-up     Subjective  Ron is here for routine follow-up of multiple medical problems.    Coronary artery disease with a non-Q wave MI April 2018.  2 drug-eluting stents in the LAD, 1 BRITTANEY in the PDA.  1 BRITTANEY and circumflex.  Also PTCA of OM and diagonal.  No recurrent chest pain since.    Good exertional ability with doing the treadmill 4 times a week.    No palpitations or history of arrhythmia.    No lightheaded dizzy spells.  His blood pressure is usually well controlled on his checks.  On atenolol 25 mg a day.    No generalized myalgias on Lipitor 20 mg.  May 2019 labs reviewed with normal creatinine and liver test.  Normal blood sugar.  LDL 46 and HDL 53.    May 2018 echo reviewed with ejection fraction 55-60%.  Mild aortic insufficiency.  Grade 1 diastolic dysfunction.    Chronic lower extremity edema at trace levels, stable.  No increasing shortness of breath.    He is no longer on the Effient.  That was discontinued by cardiology in June.  Given a 1 year follow-up with cardiology.    Continues on aspirin daily although is alternating the dose every other day.  No epigastric pain or bleeding issues.    Mild COPD, ex-smoker.  No new cough.    Prostate cancer 3+3 on biopsy in March of this year.  On watchful waiting plan.  Urinating adequately with Flomax.  I did review labs from September 2019 with a PSA up to 11.0.  It was 9.0 last December.  He has a follow-up appointment with urology in February.    History of laminectomy in 2017 of L2-5.  No radicular pain.  He is walking regularly on the treadmill as above.    He did have recurrent squamous cell cancer of the scalp in June with Mohs surgery.  That is healed up now.  He has a follow-up appointment next month with dermatology.    He plans to see ophthalmology next month for routine checkup.    No  abdominal pain complaints.    Continues to live independently with wife.    PMHx:    Past Medical History:   Diagnosis Date     Adenomatous colon polyp     in 2014, 5 year follow up     BPH (benign prostatic hyperplasia)     on flomax     CAD (coronary artery disease)     BMS LAD '01/ neg stress test '07/ no h/o MI     COPD (chronic obstructive pulmonary disease) (H)     mild/ former smoker     History of basal cell carcinoma of skin     yearly exam in Sept.     HTN (hypertension)      Hypercholesteremia     controlled on lipitor     Prostate cancer (H)     heriberto 3+3 on 4/14 bx.  No Tx yet, watchful waiting.      Spinal stenosis     severe L3-4, mod L4-5     PSHx:  No past surgical history on file.  Immunizations:   Immunization History   Administered Date(s) Administered     Influenza high dose,seasonal,PF, 65+ yrs 10/24/2018     Influenza, inj, historic,unspecified 09/30/2009, 10/07/2010     Pneumo Conj 13-V (2010&after) 04/27/2015     Pneumo Polysac 23-V 01/01/2004, 06/23/2010     Td,adult,historic,unspecified 03/05/2008     Tdap 07/25/2012     ZOSTER, LIVE 01/21/2009       ROS A comprehensive review of systems was performed and was otherwise negative    Medications, allergies, and problem list were reviewed and updated    Exam  /76 (Patient Site: Right Arm, Patient Position: Sitting, Cuff Size: Adult Large)   Pulse 68   Wt (!) 227 lb 11.2 oz (103.3 kg)   BMI 33.38 kg/m    Alert and oriented x3.  Good mentation.  Good mobility and able to climb up on exam table without difficulty.  No jaundice.  Lungs are clear.  Heart is regular with no murmur rub or gallop.  Abdomen is nontender.  Trace ankle edema bilaterally.    Blood pressure rechecked by me was 120/70    Assessment/Plan  1. Coronary atherosclerosis due to calcified coronary lesion  Asymptomatic with good exertional ability.    Continue aspirin daily.    Lipitor 20 mg.  - nitroglycerin (NITROSTAT) 0.4 MG SL tablet; Place 1 tablet (0.4 mg total)  under the tongue every 5 (five) minutes as needed for chest pain.  Dispense: 25 tablet; Refill: 1  - Lipid Cascade    2. Essential hypertension  Well-controlled.  Atenolol daily.    3. Prostate cancer (H)  Watchful waiting.  Urination controlled with Flomax.  Follow-up with urology in February.    4. Spinal stenosis of lumbar region, unspecified whether neurogenic claudication present  No radicular pain.  Continue regular treadmill use 4 times a week.    5. Essential Hypercholesterolemia  Well-controlled on Lipitor 20 mg    6. Medication monitoring encounter    - Comprehensive Metabolic Panel    7. History of squamous cell carcinoma of skin  Recurrent cancer.  Follow-up with dermatology next month.    He has had the flu shot.    Previous colon polyp, but no further colonoscopies secondary to age and cardiac risk.    Mild COPD, no flare.    Return in about 6 months (around 5/20/2020) for Annual physical.   Patient Instructions   Continue current medications.    Continue with regular walking.    Nitroglycerin was refilled, but if you do have new chest pain or start taking nitroglycerin, seek medical attention immediately.    Routine follow-up in 6 months for adult wellness visit.    Alejandro Redd MD        Current Outpatient Medications   Medication Sig Dispense Refill     ascorbic acid, vitamin C, (ASCORBIC ACID WITH HERMILA HIPS) 500 MG tablet Take 500 mg by mouth daily.       aspirin 325 MG tablet Take 325 mg by mouth every other day.       aspirin 81 MG EC tablet Take 81 mg by mouth every other day.              atenolol (TENORMIN) 25 MG tablet TAKE 1 TABLET BY MOUTH  DAILY 90 tablet 2     cholecalciferol, vitamin D3, (VITAMIN D3) 1,000 unit capsule Take 1,000 Units by mouth daily.              fish oil-dha-epa 1,200-144-216 mg cap Take 1,000 mg by mouth daily .             fluticasone (FLONASE) 50 mcg/actuation nasal spray Use 1 spray nasally two  times daily as needed for  hay fever 48 g 11     FOLIC  ACID/MULTIVITS-MIN/LUT (CENTRUM SILVER ORAL) Take 1 tablet by mouth daily.       LIPITOR 20 mg tablet TAKE 1 TABLET BY MOUTH  DAILY 90 tablet 2     naproxen sodium (ALEVE) 220 MG tablet Take 220 mg by mouth as needed .             tamsulosin (FLOMAX) 0.4 mg cap TAKE 1 CAPSULE BY MOUTH AT  BEDTIME 90 capsule 2     zinc 50 mg Tab Take 1 tablet by mouth daily.       nitroglycerin (NITROSTAT) 0.4 MG SL tablet Place 1 tablet (0.4 mg total) under the tongue every 5 (five) minutes as needed for chest pain. 25 tablet 1     No current facility-administered medications for this visit.      Allergies   Allergen Reactions     Nuts - Unspecified Itching     Other reaction(s): Tongue Swelling  Brazil nuts     Social History     Tobacco Use     Smoking status: Former Smoker     Smokeless tobacco: Never Used   Substance Use Topics     Alcohol use: Not on file     Drug use: Not on file

## 2021-06-03 NOTE — TELEPHONE ENCOUNTER
Scott Hoover [birth year] One of the batteries for hearing aid is only  Lasting about 11 hours, please call to advise

## 2021-06-04 VITALS
HEART RATE: 68 BPM | SYSTOLIC BLOOD PRESSURE: 120 MMHG | WEIGHT: 227.7 LBS | DIASTOLIC BLOOD PRESSURE: 70 MMHG | BODY MASS INDEX: 33.38 KG/M2

## 2021-06-04 NOTE — TELEPHONE ENCOUNTER
Refill Approved    Rx renewed per Medication Renewal Policy. Medication was last renewed on 3/1/19.    Nida Silverio, [facility] Triage/Med Refill 12/14/2019     Requested Prescriptions   Pending Prescriptions Disp Refills     tamsulosin (FLOMAX) 0.4 mg cap [Pharmacy Med Name: TAMSULOSIN  0.4MG  CAP] 90 capsule      Sig: TAKE 1 CAPSULE BY MOUTH AT  BEDTIME       Alfuzosin/Tamsulosin/Silodosin Refill Protocol  Passed - 12/12/2019  8:06 PM        Passed - PCP or prescribing provider visit in past 12 months       Last office visit with prescriber/PCP: 11/20/2019 Alejandro Redd MD OR same dept: 11/20/2019 Alejandro Redd MD OR same specialty: 11/20/2019 Alejandro Redd MD  Last physical: 5/20/2019 Last MTM visit: Visit date not found   Next visit within 3 mo: Visit date not found  Next physical within 3 mo: Visit date not found  Prescriber OR PCP: Alejandro Redd MD  Last diagnosis associated with med order: 1. Hypertension  - atenolol (TENORMIN) 25 MG tablet [Pharmacy Med Name: ATENOLOL  25MG  TAB]; TAKE 1 TABLET BY MOUTH  DAILY  Dispense: 90 tablet; Refill: 2    If protocol passes may refill for 12 months if within 3 months of last provider visit (or a total of 15 months).             atenolol (TENORMIN) 25 MG tablet [Pharmacy Med Name: ATENOLOL  25MG  TAB] 90 tablet 2     Sig: TAKE 1 TABLET BY MOUTH  DAILY       Beta-Blockers Refill Protocol Passed - 12/12/2019  8:06 PM        Passed - PCP or prescribing provider visit in past 12 months or next 3 months     Last office visit with prescriber/PCP: 11/20/2019 Alejandro Redd MD OR same dept: 11/20/2019 Alejandro Redd MD OR same specialty: 11/20/2019 Alejandro Redd MD  Last physical: 5/20/2019 Last MTM visit: Visit date not found   Next visit within 3 mo: Visit date not found  Next physical within 3 mo: Visit date not found  Prescriber OR PCP: Alejandro Redd MD  Last diagnosis associated with med order: 1. Hypertension  - atenolol (TENORMIN) 25 MG tablet [Pharmacy  Med Name: ATENOLOL  25MG  TAB]; TAKE 1 TABLET BY MOUTH  DAILY  Dispense: 90 tablet; Refill: 2    If protocol passes may refill for 12 months if within 3 months of last provider visit (or a total of 15 months).             Passed - Blood pressure filed in past 12 months     BP Readings from Last 1 Encounters:   11/20/19 120/70

## 2021-06-05 VITALS
SYSTOLIC BLOOD PRESSURE: 150 MMHG | DIASTOLIC BLOOD PRESSURE: 80 MMHG | OXYGEN SATURATION: 98 % | HEIGHT: 69 IN | BODY MASS INDEX: 34.66 KG/M2 | WEIGHT: 234 LBS | HEART RATE: 70 BPM | TEMPERATURE: 97.1 F

## 2021-06-05 VITALS
WEIGHT: 234.6 LBS | SYSTOLIC BLOOD PRESSURE: 146 MMHG | HEART RATE: 76 BPM | BODY MASS INDEX: 34.9 KG/M2 | DIASTOLIC BLOOD PRESSURE: 74 MMHG

## 2021-06-05 NOTE — TELEPHONE ENCOUNTER
RN cannot approve Refill Request    RN can NOT refill this medication med is not covered by policy/route to provider. Last office visit: 11/20/2019 Alejandro Redd MD Last Physical: 5/20/2019 Last MTM visit: Visit date not found Last visit same specialty: 11/20/2019 Alejandro Redd MD.  Next visit within 3 mo: Visit date not found  Next physical within 3 mo: Visit date not found      Catherine Chang, [facility] Triage/Med Refill 1/26/2020    Requested Prescriptions   Pending Prescriptions Disp Refills     nitroglycerin (NITROSTAT) 0.4 MG SL tablet [Pharmacy Med Name: NITROGLYCERIN 0.4MG SUBLINGUAL 25S] 50 tablet 0     Sig: DISSOLVE 1 TABLET UNDER THE TONGUE EVERY 5 MINUTES AS  NEEDED FOR CHEST PAIN (MAX  3 TABS IN 15 MINUTES, CALL  911 IF CHEST PAIN PERSISTS)       There is no refill protocol information for this order

## 2021-06-05 NOTE — TELEPHONE ENCOUNTER
Refill Approved    Rx renewed per Medication Renewal Policy. Medication was last renewed on 4/26/19.    Talisha Willis, [facility] Triage/Med Refill 1/12/2020     Requested Prescriptions   Pending Prescriptions Disp Refills     LIPITOR 20 mg tablet [Pharmacy Med Name: LIPITOR  20MG  TAB] 90 tablet 2     Sig: TAKE 1 TABLET BY MOUTH  DAILY       Statins Refill Protocol (Hmg CoA Reductase Inhibitors) Passed - 1/9/2020  3:41 AM        Passed - PCP or prescribing provider visit in past 12 months      Last office visit with prescriber/PCP: 11/20/2019 Alejandro Redd MD OR same dept: 11/20/2019 Alejandro Redd MD OR same specialty: 11/20/2019 Alejandro Redd MD  Last physical: 5/20/2019 Last MTM visit: Visit date not found   Next visit within 3 mo: Visit date not found  Next physical within 3 mo: Visit date not found  Prescriber OR PCP: Alejandro Redd MD  Last diagnosis associated with med order: 1. Hypercholesterolemia  - LIPITOR 20 mg tablet [Pharmacy Med Name: LIPITOR  20MG  TAB]; TAKE 1 TABLET BY MOUTH  DAILY  Dispense: 90 tablet; Refill: 2    If protocol passes may refill for 12 months if within 3 months of last provider visit (or a total of 15 months).

## 2021-06-06 NOTE — TELEPHONE ENCOUNTER
New silver zinc rechargeable hearing aid batteries (size 312) have been ordered from [employer]. Once they are in, patient will be contacted to pick them up at the [facility] . Until they arrive, patient may use disposable zinc air batteries (size 312) available wherever batteries are sold. Mr. Cherry expressed verbal understanding of this information and plan.

## 2021-06-06 NOTE — TELEPHONE ENCOUNTER
Pt Scott Cherry 1/30/1934  [phone]  Pt having issue with hearing battery life.  Not lasting more than a few hours

## 2021-06-06 NOTE — TELEPHONE ENCOUNTER
New rechargeable hearing aid batteries are available for patient to  at the [facility] . Advised charging them in the hearing aids for a minimum of 7 hours before using. Aileen Cherry expressed verbal understanding.

## 2021-06-09 NOTE — PROGRESS NOTES
[facility] Follow Up Note    Scott Cherry   83 y.o. male    Date of Visit: 2/23/2017    Chief Complaint   Patient presents with     Pre-op Exam     back surgery, on 3/6/17, Dr Gibbs, @ [facility]     Subjective  Scott is here for preoperative prior to spinal stenosis surgery.  He is going to do it out in [place] on March 6.    Patient has severe spinal stenosis at L3-L5.  Also moderate L2-L3 facet arthropathy.  Increasing lower back pain and stiffness.  No new radicular pain or leg weakness, however.    He still able to walk around [facility] about 2 laps on a regular basis, even recently.    Good exercise tolerance without shortness of breath or chest pain.    He is an ex-smoker and has mild COPD, but no active cough or shortness of breath or wheezing now.  Does not use inhalers.  Negative chest x-ray May 2016.    Patient had a cardiac nuclear stress test at [facility] on January 17 of this year.  It showed a very mild positive apical lateral ischemia area, but otherwise negative.  Normal ejection fraction of 65%.  Cardiology specifically noted that it was mild, no further intervention recommended, and it was okay for spinal surgery.    Patient has not had an MI.  He did have a bare metal stent in his LAD placed in 2001.    Hypertension well-controlled on atenolol every morning.  Patient has been on a full aspirin alternating with a baby aspirin every other day, but plans to go to just 81 mg of aspirin prior to the surgery.  He states the surgery clinic told him it was okay to stay on his low-dose aspirin.    Continues on Lipitor, has not had muscle or liver problems with that.    He does have a past history of prostate cancer 3+3 lesion noted on biopsy in 2014, but no intervention and he is on a watchful waiting plan.  Mild voiding difficulty, but stable on Flomax daily at bedtime.  No dysuria or hematuria.    His bowels are regular without constipation  "issues or abdominal pain.    Past history of frequent skin cancers, was seen in December by dermatology.  Small skin cancer behind his right ear removed.  He'll follow-up in the spring with dermatology for further lesions.    Normal bone density on DEXA scan December 2016.    No cough, fever or new respiratory symptoms.    No history of DVT or pulmonary embolism.    No history of sleep apnea.    Last colonoscopy June 2014 showed an adenoma.    PMHx:    Past Medical History:   Diagnosis Date     Adenomatous colon polyp     in 2014, 5 year follow up     BPH (benign prostatic hyperplasia)     on flomax     CAD (coronary artery disease)     BMS LAD '01/ neg stress test '07/ no h/o MI     COPD (chronic obstructive pulmonary disease)     mild/ former smoker     History of basal cell carcinoma of skin     yearly exam in Sept.     HTN (hypertension)      Hypercholesteremia     controlled on lipitor     Prostate cancer     heriberto 3+3 on 4/14 bx.  No Tx yet, watchful waiting.      Spinal stenosis     severe L3-4, mod L4-5     PSHx:  No past surgical history on file.  Immunizations:   Immunization History   Administered Date(s) Administered     Influenza, inj, historic 09/30/2009, 10/07/2010     Pneumo Conj 13-V (2010&after) 04/27/2015     Pneumo Polysac 23-V 01/01/2004, 06/23/2010     Td, historic 03/05/2008     Tdap 07/25/2012     ZOSTER 01/21/2009       ROS A comprehensive review of systems was performed and was otherwise negative    Medications, allergies, and problem list were reviewed and updated    Exam  Visit Vitals     /78     Pulse (!) 57     Ht 5' 9\" (1.753 m)     Wt (!) 250 lb (113.4 kg)     SpO2 98%     BMI 36.92 kg/m2     Moderately overweight elderly male.  Alert and oriented ×3.  Pupils irises are equal and reactive.  No jaundice or conjunctivitis.  Pharynx is normal.  Teeth in good condition.  No cervical or supraclavicular adenopathy.  No JVD and no carotid bruits.  Lungs clear to auscultation with " normal respiratory excursion.  Heart is regular with no murmur rub or gallop.  Abdomen is mildly overweight, nontender no hepatosplenomegaly or pulsatile mass.  Lower back.  Within normal limits.  Skin is intact without scars.  Normal plantar and dorsiflexion of the feet bilaterally.  No leg weakness.  Trace bilateral lower extremity edema, chronic venous insufficiency in weight.    ECG tracing reviewed by me.  Sinus bradycardia with first degree AV block otherwise normal EKG.    Assessment/Plan  1. Pre-op exam  Patient cleared for spinal decompressive surgery as planned.  Cleared for general anesthesia.  Recent stress test as above, and given clearance by cardiology for surgery.    The BMP after midnight for the surgery.    He'll follow-up with the back surgeons for his postop check.  I can see him for his other medical issues in 3 months or as needed.  - Electrocardiogram Perform and Read  - Urinalysis-UC if Indicated    Patient is full code.    2. Spinal stenosis of lumbar region  Moderately severe and progressive symptoms.  Proceed with decompression surgery as planned.    I reviewed some of the risks including bleeding, spinal cord injury with paralysis, infection, stroke and heart attack risk of anesthesia.  Patient's sepsis of surgery and wishes to proceed.    3. Coronary atherosclerosis due to calcified coronary lesion  Asymptomatic in recent stress test as above.    He will continue on 81 mg of aspirin daily.  He will skip it on the day of surgery, restart after surgery.  I did discuss bleeding risk with patient.    He will take the atenolol the simple water on the morning of surgery.    4. Medication monitoring encounter    - Comprehensive Metabolic Panel  - HM2(CBC w/o Differential)    5. Prostate cancer  Watchful waiting plan.  Intuniv Flomax daily at bedtime.  Patient was warned about risk of bladder retention, having difficulty voiding to seek medical attention immediately.  Check postvoid residual  after surgery.  - Urinalysis-UC if Indicated    History of multiple skin cancers, follow-up with dermatology in the spring and recovered from spine surgery.    Return in about 3 months (around 5/23/2017) for Recheck.   There are no Patient Instructions on file for this visit.  Alejandro Redd MD  Total time with patient over 25 minutes and over 50% coord care.  Time all face to face.  The following high BMI interventions were performed this visit: encouragement to exercise    Current Outpatient Prescriptions   Medication Sig Dispense Refill     ascorbic acid, vitamin C, (ASCORBIC ACID WITH HERMILA HIPS) 500 MG tablet Take 500 mg by mouth daily.       aspirin 325 MG tablet Take 325 mg by mouth every other day. Alternates with 81mg       aspirin 81 MG EC tablet Take 81 mg by mouth every other day. Alternates with 325mg       atenolol (TENORMIN) 25 MG tablet Take 1 tablet by mouth  daily 90 tablet 2     cholecalciferol, vitamin D3, (VITAMIN D3) 1,000 unit capsule Take 2 capsules daily       fish oil-dha-epa 1,200-144-216 mg cap Take 1 capsule by mouth daily.       fluticasone (FLONASE) 50 mcg/actuation nasal spray Use 1 spray nasally two  times daily as needed for  hay fever 48 g 11     FOLIC ACID/MULTIVITS-MIN/LUT (CENTRUM SILVER ORAL) Take 1 tablet by mouth daily.       LIPITOR 20 mg tablet Take 1 tablet by mouth  daily 90 tablet 2     zinc 50 mg Tab Take 1 tablet by mouth daily.       tamsulosin (FLOMAX) 0.4 mg Cp24 Take 1 capsule by mouth at  bedtime 90 capsule 3     No current facility-administered medications for this visit.      Allergies   Allergen Reactions     Nuts - Unspecified Itching     Other reaction(s): Tongue Swelling  Brazil nuts     Social History   Substance Use Topics     Smoking status: Former Smoker     Smokeless tobacco: None     Alcohol use None

## 2021-06-10 NOTE — PROGRESS NOTES
[facility] Follow Up Note    Scott Cherry   83 y.o. male    Date of Visit: 5/10/2017    Chief Complaint   Patient presents with     Follow-up     fasting, handicap parking     Subjective  Scott is here for follow-up after his lumbar spinal decompression surgery in March.  He stated it went well, they did not do effusion.  His radicular leg pain and groin pain is resolved.  He went off pain medications in less than a week, not taking any pain medication now except for occasional ibuprofen.    He is on a regular daily walking routine, [employer] in the winter, walks around the yard and does yard work now.  He does use a cane.  No falls.    Past history of mild COPD, ex-smoker.  No increasing shortness of breath or new cough.  Last chest x-ray negative May 2016.    Blood pressure is well controlled without lightheaded dizzy spells.  He has chronic trace ankle edema which is stable.  Still on atenolol 25 mg a day.    Tolerating Lipitor without new generalized myalgias.  No epigastric pain or GI bleeding on the aspirin.    I did check his labs and cholesterol in February, I reviewed those results with him today, cholesterol well controlled with LDL 52.    Coronary artery disease with bare-metal stent to the LAD back in 2001.  No history of MI.  Stress test January 2017 did show very small area of mild apical lateral ischemia, but felt to be low risk and no intervention recommended by cardiology, medical management plan.  #2016 cardiac echo was otherwise normal.  He did not have any cardiac complications of the surgery.  No palpitations or fast heart rate spells.  No chest pain.    Prostate cancer 3+3 on biopsy in 2014.  On Flomax.  No urinary complications with surgery.  Urinating normally now.  Seen last month by urology, right lobe was firm, PSA mildly elevated at 7.3.  Six-month follow-up PSA, consider MRI next year.    Past history of skin cancer with Mohs surgery January 2016.   "No evidence of new recurrence.  He has a six-month follow-up scheduled for next month with dermatology.    Normal bone density December 2016.    PMHx:    Past Medical History:   Diagnosis Date     Adenomatous colon polyp     in 2014, 5 year follow up     BPH (benign prostatic hyperplasia)     on flomax     CAD (coronary artery disease)     BMS LAD '01/ neg stress test '07/ no h/o MI     COPD (chronic obstructive pulmonary disease)     mild/ former smoker     History of basal cell carcinoma of skin     yearly exam in Sept.     HTN (hypertension)      Hypercholesteremia     controlled on lipitor     Prostate cancer     heriberto 3+3 on 4/14 bx.  No Tx yet, watchful waiting.      Spinal stenosis     severe L3-4, mod L4-5     PSHx:  No past surgical history on file.  Immunizations:   Immunization History   Administered Date(s) Administered     Influenza, inj, historic 09/30/2009, 10/07/2010     Pneumo Conj 13-V (2010&after) 04/27/2015     Pneumo Polysac 23-V 01/01/2004, 06/23/2010     Td, historic 03/05/2008     Tdap 07/25/2012     ZOSTER 01/21/2009       ROS A comprehensive review of systems was performed and was otherwise negative    Medications, allergies, and problem list were reviewed and updated    Exam  /72  Pulse (!) 57  Ht 5' 9\" (1.753 m)  Wt (!) 247 lb (112 kg)  SpO2 98%  BMI 36.48 kg/m2  Appears well.  Moderately overweight.  Alert and oriented ×3.  No jaundice.  No JVD.  Lungs are clear to auscultation.  Heart is regular no murmur.  Abdomen is moderately overweight, nontender trace ankle edema bilaterally unchanged.  Incision on his lower back is well-healed.  Able to climb up on the exam table without difficulty.    Gait is just mildly unsteady, otherwise looks legs well and no foot drop.  Uses cane.    Assessment/Plan  1. Coronary atherosclerosis due to calcified coronary lesion  Asymptomatic.  Continue regular ambulation.  Continue medical management with Lipitor and aspirin    2. Essential " Hypercholesterolemia  Well-controlled in February.  Follow-up fasting in 6 months    3. Essential hypertension  Well-controlled on atenolol    4. Spinal stenosis  Status post decompression surgery L2-L5.  That went well.  Continue regular ambulation.  Uses a cane, handicap parking sticker form filled out for him today.  Continue with goal of further weight loss and regular ambulation.    5. Adenocarcinoma of prostate  Slow progression of a low-grade prostate cancer.  Follow-up in 6 months with urology    Voiding adequately with Flomax.    6. History of squamous cell carcinoma of skin  Appointment next month with dermatology.    History of colon polyp June 2014, evaluate medical condition in 2019 to determine a 5 year follow-up as appropriate.    Mild COPD with previous smoking, no new symptoms    Full code.    Return in about 6 months (around 11/10/2017) for Recheck.   There are no Patient Instructions on file for this visit.  Alejandro Redd MD    The following high BMI interventions were performed this visit: encouragement to exercise    Current Outpatient Prescriptions   Medication Sig Dispense Refill     ascorbic acid, vitamin C, (ASCORBIC ACID WITH HERMILA HIPS) 500 MG tablet Take 500 mg by mouth daily.       aspirin 325 MG tablet Take 325 mg by mouth every other day. Alternates with 81mg       aspirin 81 MG EC tablet Take 81 mg by mouth every other day. Alternates with 325mg       atenolol (TENORMIN) 25 MG tablet Take 1 tablet by mouth  daily 90 tablet 3     cholecalciferol, vitamin D3, (VITAMIN D3) 1,000 unit capsule Take 2 capsules daily       fish oil-dha-epa 1,200-144-216 mg cap Take 1 capsule by mouth daily.       fluticasone (FLONASE) 50 mcg/actuation nasal spray Use 1 spray nasally two  times daily as needed for  hay fever 48 g 11     FOLIC ACID/MULTIVITS-MIN/LUT (CENTRUM SILVER ORAL) Take 1 tablet by mouth daily.       LIPITOR 20 mg tablet Take 1 tablet by mouth  daily 90 tablet 3     naproxen sodium  (ALEVE) 220 MG tablet Take 220 mg by mouth 2 (two) times a day with meals.       tamsulosin (FLOMAX) 0.4 mg Cp24 Take 1 capsule by mouth at  bedtime 90 capsule 3     zinc 50 mg Tab Take 1 tablet by mouth daily.       No current facility-administered medications for this visit.      Allergies   Allergen Reactions     Nuts - Unspecified Itching     Other reaction(s): Tongue Swelling  Brazil nuts     Social History   Substance Use Topics     Smoking status: Former Smoker     Smokeless tobacco: None     Alcohol use None

## 2021-06-11 NOTE — PROGRESS NOTES
"Audiology only; hearing aid evaluation (insurance patient)    Scott Cherry was accompanied today by his wife, Angela.  Medical clearance for amplification was provided by Maikol Monroy MD, at ENT consult dated 10-31-16.  The benefits and limitations of amplification were discussed, and a mutual decision was made for a binaural fit with [employer] Fit 700 rechargeable -in-canal devices (in color \"patricia suede\" [S2]), coupled to the ears with xP receivers (length = 2, bilaterally) and generic power domes. Purchase agreement and itemized billing form were completed and signed, and copies were provided to the patient, along with a copy of the [facility] consumer brochure on the purchase of amplification.  Order will be placed via phone; purchase requisition will be completed online.  Fitting will be anticipated in 3-4 weeks at [facility]; appointment will need to be 90 minutes in length to allow for retesting hearing (last audiogram was dated 10-31-16).  Patient will be contacted to schedule fitting appointment; he did not have a calendar with him today and was uncertain about future engagements. Both  & Ms. Cherry were in verbal agreement with this information and plan.    Mary Madrid, [facility], [place]  [place] Licensed Audiologist [id number]      "

## 2021-06-11 NOTE — PROGRESS NOTES
Assessment and Plan:   Patient instructions:  Walk for 20 minutes every day.  Daily walking will help you strengthen your pelvic muscles after your recent pelvic radiation.    See dermatology in December as planned.    See radiation oncology for prostate follow-up in December, as planned.    Get flu shot this fall.    Routine nonfasting checkup with me in 6 months.    1. Healthcare maintenance  He did confirm full CODE STATUS wishes.    He will get a flu shot later this fall at local pharmacy.    He did have a previous colon polyp in 2014.  Bowels are normal and no blood in stool.  No further colonoscopies with age and coronary disease.    I stressed the importance of daily walking.  He is not been doing his treadmill walking because his son is using the basement has a home office.  Patient will plan to start getting back to a regular walking routine.    Ophthalmology yesterday.  No problems.  1 year follow-up plan.    Living independently with wife.  No significant alcohol.  Cognition and mood good.    2. Hypertension  Blood pressure normal on recheck.  Is been normal on other checks for patient.    Continues on atenolol 25 mg a day with history of coronary disease.    Could consider adding a low-dose diuretic in the future if needed.  Has chronic lower extremity lymphedema.    Initial high blood pressure but blood pressure normalized quickly with sitting for 5 minutes.  This is been a pattern with him in the past.  Continue to monitor blood pressure.    3. Coronary atherosclerosis due to calcified coronary lesion  Asymptomatic.    Non-Q wave MI in April 2018 followed by 4 drug-eluting stents, 2 in the LAD, one in PDA and one in circumflex.  Also a PTCA of the OM and diagonal at that time.    He is not had chest pain or cardiac event since.    Alternating full dose aspirin with low-dose aspirin.  No epigastric pain or bleeding issues.    Cholesterol is well controlled last year on Lipitor 20 mg.  No new  generalized myalgias.  - Lipid Cascade    Continue current medical management    No palpitations or history of atrial fibrillation.    I did review heart echo from May 2018 with ejection fraction 55-60% mild AI, grade 1 diastolic dysfunction.    Chronic lower extremity edema stable.  No increasing shortness of breath.  Could consider adding low-dose diuretic if needed.    4. Prostate cancer (H)  3+3 diagnosed last year.  PSA was up to 12.3 in March.  Started radiation in August and just finished on September 8.  3-month follow-up with radiation oncology for PSA and follow-up.    Some mild increased urinary frequency and just mild incontinence.  No proctitis symptoms.    I stressed the importance of daily walking as he recovers from the pelvic radiation.    5. History of squamous cell carcinoma of skin  He did find another squamous cell on scalp this past July.  He has a follow-up in December with dermatology.  No new skin lesions noted today.    6. Medication monitoring encounter    - Comprehensive Metabolic Panel  - HM2(CBC w/o Differential)    7. Routine general medical examination at a health care facility  No flare of his chronic back pain.  Laminectomy L2-5 in 2017.    Quit smoking over 50 years ago.  No new cough.  No increasing shortness of breath and lungs are clear.    The patient's current medical problems were reviewed.    I have had an Advance Directives discussion with the patient.  The following health maintenance schedule was reviewed with the patient and provided in printed form in the after visit summary:   Health Maintenance   Topic Date Due     ZOSTER VACCINES (2 of 3) 03/18/2009     MEDICARE ANNUAL WELLNESS VISIT  05/20/2020     FALL RISK ASSESSMENT  05/20/2020     INFLUENZA VACCINE RULE BASED (1) 08/01/2020     TD 18+ HE  07/25/2022     ADVANCE CARE PLANNING  05/20/2024     PNEUMOCOCCAL IMMUNIZATION 65+ LOW/MEDIUM RISK  Completed     HEPATITIS B VACCINES  Aged Out        Subjective:   Chief  Complaint: Scott Cherry is an 86 y.o. male here for an Annual Wellness visit.   HPI: 86-year-old male.  History outlined as above.    No mouth sores or swallowing difficulty.  Uses cane, no recent falls.  No palpitations or history of arrhythmia.    Still on Flomax but not having straining problems after pelvic radiation.  Urine frequency symptoms of mild incontinence as above.    Chronic lower extremity edema has been stable and does not bother patient.  No increasing shortness of breath.  He had some diastolic dysfunction on heart echo in 2018.  He does not have a history of sleep apnea diagnosed.    Review of Systems: Otherwise negative.  Please see above.  The rest of the review of systems are negative for all systems.    Patient Care Team:  Alejandro Redd MD as PCP - General  Alejandro Redd MD as Assigned PCP     Patient Active Problem List   Diagnosis     Foot Pain (Soft Tissue)     Essential Hypercholesterolemia     Coronary Artery Disease     Serology Prostate-specific Antigen (PSA) Elevated     Cough     Acute Gingivitis     Aphthous Ulcer     Benign Adenomatous Polyp Of The Large Intestine     Unspecified cataract     Prostate cancer (H)     Spinal stenosis, lumbar     Medication monitoring encounter     History of squamous cell carcinoma of skin     Sensorineural hearing loss, bilateral     Past Medical History:   Diagnosis Date     Adenomatous colon polyp     in 2014, 5 year follow up     BPH (benign prostatic hyperplasia)     on flomax     CAD (coronary artery disease)     BMS LAD '01/ neg stress test '07/ no h/o MI     COPD (chronic obstructive pulmonary disease) (H)     mild/ former smoker     History of basal cell carcinoma of skin     yearly exam in Sept.     HTN (hypertension)      Hypercholesteremia     controlled on lipitor     Prostate cancer (H)     heriberto 3+3 on 4/14 bx.  No Tx yet, watchful waiting.      Spinal stenosis     severe L3-4, mod L4-5      No past surgical history on file.    No family history on file.   Social History     Socioeconomic History     Marital status:      Spouse name: Not on file     Number of children: Not on file     Years of education: Not on file     Highest education level: Not on file   Occupational History     Not on file   Social Needs     Financial resource strain: Not on file     Food insecurity     Worry: Not on file     Inability: Not on file     Transportation needs     Medical: Not on file     Non-medical: Not on file   Tobacco Use     Smoking status: Former Smoker     Smokeless tobacco: Never Used   Substance and Sexual Activity     Alcohol use: Not on file     Drug use: Not on file     Sexual activity: Not on file   Lifestyle     Physical activity     Days per week: Not on file     Minutes per session: Not on file     Stress: Not on file   Relationships     Social connections     Talks on phone: Not on file     Gets together: Not on file     Attends Alevism service: Not on file     Active member of club or organization: Not on file     Attends meetings of clubs or organizations: Not on file     Relationship status: Not on file     Intimate partner violence     Fear of current or ex partner: Not on file     Emotionally abused: Not on file     Physically abused: Not on file     Forced sexual activity: Not on file   Other Topics Concern     Not on file   Social History Narrative     Not on file      Current Outpatient Medications   Medication Sig Dispense Refill     ascorbic acid, vitamin C, (ASCORBIC ACID WITH HERMILA HIPS) 500 MG tablet Take 500 mg by mouth daily.       aspirin 325 MG tablet Take 325 mg by mouth every other day.       aspirin 81 MG EC tablet Take 81 mg by mouth every other day.              atenolol (TENORMIN) 25 MG tablet TAKE 1 TABLET BY MOUTH  DAILY 90 tablet 3     cholecalciferol, vitamin D3, (VITAMIN D3) 1,000 unit capsule Take 1,000 Units by mouth daily.              fish oil-dha-epa 1,200-144-216 mg cap Take 1,000 mg by mouth  "daily .             fluticasone propionate (FLONASE) 50 mcg/actuation nasal spray Use 1 spray nasally two  times daily as needed for  hay fever 48 g 11     FOLIC ACID/MULTIVITS-MIN/LUT (CENTRUM SILVER ORAL) Take 1 tablet by mouth daily.       LIPITOR 20 mg tablet TAKE 1 TABLET BY MOUTH  DAILY 90 tablet 3     naproxen sodium (ALEVE) 220 MG tablet Take 220 mg by mouth as needed .             nitroglycerin (NITROSTAT) 0.4 MG SL tablet DISSOLVE 1 TABLET UNDER THE TONGUE EVERY 5 MINUTES AS  NEEDED FOR CHEST PAIN (MAX  3 TABS IN 15 MINUTES, CALL  911 IF CHEST PAIN PERSISTS) 50 tablet 0     tamsulosin (FLOMAX) 0.4 mg cap TAKE 1 CAPSULE BY MOUTH AT  BEDTIME 90 capsule 3     zinc 50 mg Tab Take 1 tablet by mouth daily.       No current facility-administered medications for this visit.       Objective:   Vital Signs:   Visit Vitals  /70   Pulse 72   Temp 96.7  F (35.9  C) (Other) Comment (Src): forehead   Ht 5' 8.75\" (1.746 m)   Wt (!) 230 lb 6.4 oz (104.5 kg)   BMI 34.27 kg/m           VisionScreening:  No exam data present     PHYSICAL EXAM  Alert and oriented x3.  Clock face drawing and word recall normal.  Mildly unsteady gait with global deconditioning and lower extremity edema.  Nonfocal neurologic exam.  Good mood and affect.  Pupils and irises equal and reactive.  Extraocular muscles intact.  No jaundice or conjunctivitis.  Wears hearing aids.  Moderate cerumen but not obstructing.  Mask on during exam.  No cervical or supraclavicular or axillary adenopathy.  No JVD and no carotid bruits.  No thyromegaly or nodularity.  Lungs are clear to auscultation with good respiratory excursion.  No wheezing or crackles.  Heart is regular with no murmur rub or gallop.  He has trace to +1 ankle edema bilaterally, slightly more since the pelvic radiation.  Abdomen is mildly overweight but nontender and no hepatosplenomegaly.  No pulsatile abdominal mass.  He declined  exam and denied any new groin issues or hernia symptoms. "  Feet exam without any pre-ulcerative calluses or ulcers.  +1 pedal pulses.  Skin exam with seborrheic keratoses.  Solar damage on scalp but no new sclerotic lesion noted.    Assessment Results 9/17/2020   Activities of Daily Living No help needed   Instrumental Activities of Daily Living No help needed   Mini Cog Total Score 5   Some recent data might be hidden     A Mini-Cog score of 0-2 suggests the possibility of dementia, score of 3-5 suggests no dementia      Identified Health Risks:     Information regarding advance directives (living sims), including where he can download the appropriate form, was provided to the patient via the AVS.

## 2021-06-11 NOTE — TELEPHONE ENCOUNTER
Spoke with patient; I had contacted [employer] yesterday re: patient's rechargeable battery request. The company that provided batteries to the hearing aid  has filed for bankruptcy; there is no new source for obtaining more silver zinc rechargeable batteries. I have two in stock and will leave them at the [facility]  for Mr. Cherry to  at his convenience (no charge).    [employer] (hearing aid ) has two options for patients left without rechargeable battery options:    1. Replacement disposable zinc air batteries can be provided by [employer] for the remainder of the hearing aid warranty period (expires 2021).    2. Option to upgrade current devices to those [employer] devices with a rechargeable lithium ion power source at a lower cost; warranty on any new devices purchased will follow current devices' warranty expiration date (2021).     These offers will  3-. A decision to accept either offer must be made prior to that date.    Information regarding these options from [employer] will be emailed to patient; current email address in record is up to date, per patient.    Mr. Cherry will let me know of his decision, should he wish to pick either option. He expressed verbal understanding of this information and plan.

## 2021-06-12 NOTE — PROGRESS NOTES
Received previous [facility] [employer] devices and ; will return to [employer] via [employer] today with order for upgraded devices. Hearing aid fitting with new devices scheduled for 11-10-20.

## 2021-06-12 NOTE — TELEPHONE ENCOUNTER
Returned patient's call; quoted him cost of $330.00 for an upgraded pair of [employer] Next Moxi Move R rechargeable devices, due to ZPower recall. He wishes this to be ordered; will call for fitting appointment once devices are in. Patient expressed verbal understanding of this information and plan.

## 2021-06-12 NOTE — PROGRESS NOTES
Audiology only; hearing aid check (first; insurance patient)    Mr. Cherry indicated general satisfaction with his devices to date. He reported he has been to [employer] (high ceiling, reverberant space) and to [employer] (sat in a thayer with one other person), and had no difficulty hearing the speaker or his dinner  despite noise in both environments. He was at [place] at a large table of approximately eight people; he was able to follow the conversation of his group despite having another large, more gregarious group seated behind him. Television has greater clarity at lower volume settings; others no longer complain about the volume of the TV as being too loud. Datalogging indicated average daily wear of 13 hours, for the past 8 days. Targets are at 97% in each device, up from 90% at the fitting appointment 8-2-17. The process for removing wax guards and changing domes was demonstrated on one device; Mr. Cherry was able to independently perform this task on the second device. Return for credit date is 9-18-17; another HAC was scheduled 9-13-17, but additional HAC appointments may be scheduled sooner if needed. Mr. Cherry was in verbal agreement with this information and plan.    Mary Madrid, [facility]-A  [place] Licensed Audiologist [id number]

## 2021-06-12 NOTE — PROGRESS NOTES
Audiology only; hearing aid check (second; insurance patient)    Mr. Cherry indicated satisfaction with the hearing aids, and indicated he will be keeping both devices. He's worn them in noisy situations, and felt the noise was attenuated while speech/music were still audible and clear. Datalogging indicated average daily wear of sixteen hours, for the past 34 days. Targets are currently at 100% in both devices; up from 90% at the fitting on 8-2-17. No additional programming changes were requested or made. Both devices were clean and in good physical repair. Further HAC appointments may be made on an as-needed basis. Mr. Cherry indicated verbal understanding of this information and plan.    Ramin Madrid., [facility]-A  [place] Licensed Audiologist [id number]

## 2021-06-12 NOTE — PROGRESS NOTES
Hearing aid fitting (insurance patient)    Scott Cherry was seen today for hearing aid fitting. Because his last audiogram is more than six months old (dated 10-31-16), hearing thresholds were rechecked today, prior to fitting.     Results:  Otoscopy was unremarkable in either ear. Hearing sensitivity was assessed with good reliability using insert phones.      Borderline normal, sloping to mild-severe sensorineural hearing loss for 250-8000 Hz, bilaterally.    Word recognition ability was excellent, bilaterally, with presentation levels significantly above typical conversational volume in both ears.    Mr. Cherry was fit binaurally with [employer] Tom[employer] Fit 700 rechargeable -in-canal devices (Right SN = [id number]; Left SN = [id number]), coupled to the ears with xP receivers (length =  2, bilaterally) and generic small power domes.  Warranty on both devices expires 8-11-21; return for credit date will be 9-18-17. Physical fit was good, and initial sound quality was acceptable to the patient.   Initial targets were set at 90%, with an automatic increase of 5% per week of wear, so as to be at 100% of targets in approximately two weeks.  Speechmapping via Verifit equipment yielded close approximations to targets at various input levels, yet results were still below targets.  Scott Cherry readily demonstrated ability to utilize the  and to open/close the battery door to turn devices off, and to insert/remove devices from his ears.  Push button was set to volume control (Right = raise volume; Left = lower volume).  Device use, care, and battery safety were all discussed. Two silver zinc rechargeable batteries were dispensed with the devices. Trial fee was received at reception desk.  Hearing aid check was scheduled 8-10-17 at [facility]. Scott Cherry was in verbal agreement with this information and plan.    Ramin Madrid., [facility]-A  [place] Licensed Audiologist  [phone]

## 2021-06-12 NOTE — TELEPHONE ENCOUNTER
Upon ordering replacement hearing aids, learned that old devices must be sent in with order. Scheduled patient for fitting 11-10-20 (next available); patient will drop off old devices at [facility]  around Oct. 27 for ordering of new devices. Mr. Cherry expressed verbal understanding of this information and plan.

## 2021-06-13 NOTE — TELEPHONE ENCOUNTER
Refill Approved    Rx renewed per Medication Renewal Policy. Medication was last renewed on 12/14/19.    Nida Silverio, [facility] Triage/Med Refill 11/16/2020     Requested Prescriptions   Pending Prescriptions Disp Refills     atenoloL (TENORMIN) 25 MG tablet [Pharmacy Med Name: ATENOLOL  25MG  TAB] 90 tablet 3     Sig: TAKE 1 TABLET BY MOUTH  DAILY       Beta-Blockers Refill Protocol Passed - 11/12/2020  8:14 PM        Passed - PCP or prescribing provider visit in past 12 months or next 3 months     Last office visit with prescriber/PCP: 11/20/2019 Alejandro Redd MD OR same dept: 11/20/2019 Alejandro Redd MD OR same specialty: 11/20/2019 Alejandro Redd MD  Last physical: 9/17/2020 Last MTM visit: Visit date not found   Next visit within 3 mo: Visit date not found  Next physical within 3 mo: Visit date not found  Prescriber OR PCP: Alejandro Redd MD  Last diagnosis associated with med order: 1. Hypertension  - atenoloL (TENORMIN) 25 MG tablet [Pharmacy Med Name: ATENOLOL  25MG  TAB]; TAKE 1 TABLET BY MOUTH  DAILY  Dispense: 90 tablet; Refill: 3    2. Serology Prostate-specific Antigen (PSA) Elevated  - tamsulosin (FLOMAX) 0.4 mg cap [Pharmacy Med Name: TAMSULOSIN  0.4MG  CAP]; TAKE 1 CAPSULE BY MOUTH AT  BEDTIME  Dispense: 90 capsule; Refill: 3    If protocol passes may refill for 12 months if within 3 months of last provider visit (or a total of 15 months).             Passed - Blood pressure filed in past 12 months     BP Readings from Last 1 Encounters:   09/17/20 124/70                tamsulosin (FLOMAX) 0.4 mg cap [Pharmacy Med Name: TAMSULOSIN  0.4MG  CAP] 90 capsule 3     Sig: TAKE 1 CAPSULE BY MOUTH AT  BEDTIME       Alfuzosin/Tamsulosin/Silodosin Refill Protocol  Passed - 11/12/2020  8:14 PM        Passed - PCP or prescribing provider visit in past 12 months       Last office visit with prescriber/PCP: 11/20/2019 Alejandro Redd MD OR same dept: 11/20/2019 Alejandro Redd MD OR same specialty:  11/20/2019 Alejandro Redd MD  Last physical: 9/17/2020 Last MTM visit: Visit date not found   Next visit within 3 mo: Visit date not found  Next physical within 3 mo: Visit date not found  Prescriber OR PCP: Alejandro Redd MD  Last diagnosis associated with med order: 1. Hypertension  - atenoloL (TENORMIN) 25 MG tablet [Pharmacy Med Name: ATENOLOL  25MG  TAB]; TAKE 1 TABLET BY MOUTH  DAILY  Dispense: 90 tablet; Refill: 3    2. Serology Prostate-specific Antigen (PSA) Elevated  - tamsulosin (FLOMAX) 0.4 mg cap [Pharmacy Med Name: TAMSULOSIN  0.4MG  CAP]; TAKE 1 CAPSULE BY MOUTH AT  BEDTIME  Dispense: 90 capsule; Refill: 3    If protocol passes may refill for 12 months if within 3 months of last provider visit (or a total of 15 months).

## 2021-06-13 NOTE — TELEPHONE ENCOUNTER
Phone call to patient in lieu of HAC appointment. Patient doing well with devices; no issues with devices or . Patient successfully unpaired devices from cell phone himself; he did not find this feature useful/did not want to be tethered to his phone. Will bring in devices for  refurbishment in July 2021, prior to end of warranty on 8-11-21. Will call with issues/questions.

## 2021-06-13 NOTE — PROGRESS NOTES
Hearing aid fitting (upgrade to new [employer] devices as a result of the ZPower unavailability)    Scott Cherry was seen today for an upgrade of devices, due to ZPower unavailability.  He was fit binaurally with [employer] DX Moxi Move R 7 rechargeable -in-canal devices (Right SN = [id number]; Left SN = [id number]), coupled to the ears with xP receivers (length = 2, bilaterally) and generic [[place]] medium vented domes.  Warranty on both devices expires 8-; return for credit date will be 12-30-20.  Ear cleaning (to remove occluding cerumen) was completed by Maikol Monroy MD, prior to fitting. Otoscopy was unremarkable in the right ear following cleaning; slight laceration/small amount of blood was present in left ear canal (Dr. Monroy indicated fitting was still appropriate with the laceration). Physical fit was good, and initial sound quality was acceptable to the patient. Occlusion manager was set at moderate. Initial targets were set at 90%, with an automatic increase of 5% per week of wear, so as to be at 100% of targets in approximately two weeks.  Speechmapping via Verifit equipment yielded close approximations to targets at various input levels, yet results were still below targets. Scott Cherry readily demonstrated ability to manipulate the on/off button (long/2 seconds press to turn on/off), and to insert/remove devices from his ears. A short press of the same push button can control volume (toggle up = raise volume; toggle down = lower volume). Device use and care, as well as use of the  were all discussed. The devices were also paired via Bluetooth with Mr. Cherry's cell phone; a short press of the same button when the phone is ringing will  the call through the hearing aid. A longer (two second) press will either decline the call when the phone is ringing, or disconnect an active call. Phone calls and music streaming were both accomplished successfully in the office  today. Hearing aid check was not scheduled at this time; I will call patient in approximately two weeks to check in and determine if HAC is needed. Scott Cherry was in verbal agreement with this information and plan.    Ramin Madrid., [facility]-A  [place] Licensed Audiologist [id number]

## 2021-06-14 NOTE — PATIENT INSTRUCTIONS - HE
Keep leg elevated. Increase regular walking. This will help reduce the leg swelling.    Stay on your current aspirin.    If you do develop sudden shortness of breath or chest pain, go to the emergency room right away to get evaluation for a blood clot.

## 2021-06-14 NOTE — PROGRESS NOTES
[facility] Follow Up Note    Scott Cherry   83 y.o. male    Date of Visit: 11/13/2017    Chief Complaint   Patient presents with     Follow-up     6 mo follow up, fasting     Subjective  Scott is here for routine follow-up of multiple medical issues.    His main issue is coronary artery disease with a bare-metal stent to the LAD in 2001.  No history of MI.  January 2017 stress test with a very small area of mild apical lateral ischemia but no EKG changes.  Cardiology felt this was low risk and no intervention.  Continue medical management.  Ejection fraction was 65% with that test.  He is routine yearly cardiology follow-up in May.    Mild COPD, ex-smoker.  Chest x-ray negative in May of last year.  No new cough or increasing shortness of breath.    He has some mild chronic lower extremity edema which is stable.  No history of DVT.    Hypertension is well controlled in the 120s-130s over 70s.  No orthostatic symptoms.  No fainting spells.  On atenolol 25 mg a day.    No epigastric pain or GI bleeding with aspirin.  Her graph no new generalized myalgias on the Lipitor.  Excellent cholesterol in February.    Lumbar decompression surgery L2-L5 March 2017.  Successful surgery and no leg radicular symptoms now.  Not taking pain medication.  Her graph he does the exercise bike almost daily.  Also walks at [employer] during the winter.  No falls.  Uses cane.    Normal bone density 12/2016.    Prostate cancer with a 3+3 on biopsy and watchful waiting plan.  2016 PSA was 5.6.  April of this year was 7.3, October 2017 PSA was 8.2 and neurology will see him again in April for recheck.  Urinating well on Flomax.  No dysuria or hematuria.    Past history of multiple skin cancers, most recently this past June, follow-up next month.  No new skin lesions currently.    Bowel movements are normal.  He had previous colon polyp in 2014, he is unsure about plans for June 2019 5 year follow-up given  "his age of 83 currently.    Patient is a new complaint of left shoulder pain that occurred in September after pulling garbage cans quickly felt a sudden pop and pain with resultant bruising on his left lateral deltoid area.  Some pain with lifting arm above 90  in a lateral position.  No pain currently.  No radicular symptoms.  He is doing regular range of motion and symptoms are improving and he declined referral to physical therapy.    PMHx:    Past Medical History:   Diagnosis Date     Adenomatous colon polyp     in 2014, 5 year follow up     BPH (benign prostatic hyperplasia)     on flomax     CAD (coronary artery disease)     BMS LAD '01/ neg stress test '07/ no h/o MI     COPD (chronic obstructive pulmonary disease)     mild/ former smoker     History of basal cell carcinoma of skin     yearly exam in Sept.     HTN (hypertension)      Hypercholesteremia     controlled on lipitor     Prostate cancer     heriberto 3+3 on 4/14 bx.  No Tx yet, watchful waiting.      Spinal stenosis     severe L3-4, mod L4-5     PSHx:  No past surgical history on file.  Immunizations:   Immunization History   Administered Date(s) Administered     Influenza, inj, historic,unspecified 09/30/2009, 10/07/2010     Pneumo Conj 13-V (2010&after) 04/27/2015     Pneumo Polysac 23-V 01/01/2004, 06/23/2010     Td,adult,historic,unspecified 03/05/2008     Tdap 07/25/2012     ZOSTER 01/21/2009       ROS A comprehensive review of systems was performed and was otherwise negative    Medications, allergies, and problem list were reviewed and updated    Exam  /78  Pulse (!) 56  Ht 5' 9\" (1.753 m)  Wt (!) 241 lb (109.3 kg)  SpO2 98%  BMI 35.59 kg/m2  Alert and oriented ×3.  Normal mood and affect.  Pupils and irises equal and reactive and no jaundice.  No oral lesions.  No JVD and no carotid bruits.  Lungs clear to auscultation with normal respiratory excursion.  Lower back incision well-healed.  Able to clamp on the exam table without " difficulty.  No foot drop.  Trace ankle edema bilaterally at baseline.  Heart is regular with no murmur.  Abdomen is nontender no hepatomegaly.  Still good range of motion on the left shoulder, some mild discomfort above 90  laterally.  There is no tenderness to palpation and no bruising.  It did not feel any loose muscle group in the biceps area.    Assessment/Plan  1. Coronary atherosclerosis due to calcified coronary lesion  Asymptomatic.  Good exercise tolerance.  Continue medical management with aspirin and Lipitor.  Patient is full code.  2. Essential Hypercholesterolemia  Well-controlled on Lipitor 20 mg.  - Lipid Cascade    3. Spinal stenosis of lumbar region, unspecified whether neurogenic claudication present  Significant improvement after lumbar decompression surgery in March of this year.  Continue regular ambulation.  Uses cane.  Not needing pain medication.    4. Prostate cancer  Watchful waiting.  Follow-up with urology in April..    Flomax.    5. History of squamous cell carcinoma of skin  Follow-up with dermatology in December    6. Medication monitoring encounter    - Comprehensive Metabolic Panel    Mild COPD stable.  He has already had his flu shot last month.    History of colon polyps, this time not planning 5 year follow-up given his age and heart history.    Left shoulder pain consistent with a rotator cuff tear, now improving.  He declines referral to orthopedics or physical therapy.    Return in about 6 months (around 5/13/2018) for Recheck.   Patient Instructions   Lab work today.    Stay active through the winter with regular walking and exercise bike.    No medication changes.    Routine six-month follow-up in the spring.    Alejandro Redd MD        Current Outpatient Prescriptions   Medication Sig Dispense Refill     ascorbic acid, vitamin C, (ASCORBIC ACID WITH HERMILA HIPS) 500 MG tablet Take 500 mg by mouth daily.       aspirin 325 MG tablet Take 325 mg by mouth every other day.  Alternates with 81mg       aspirin 81 MG EC tablet Take 81 mg by mouth every other day. Alternates with 325mg       atenolol (TENORMIN) 25 MG tablet Take 1 tablet by mouth  daily 90 tablet 3     cholecalciferol, vitamin D3, (VITAMIN D3) 1,000 unit capsule Take 2 capsules daily       fish oil-dha-epa 1,200-144-216 mg cap Take 1 capsule by mouth daily.       fluticasone (FLONASE) 50 mcg/actuation nasal spray Use 1 spray nasally two  times daily as needed for  hay fever 48 g 11     FOLIC ACID/MULTIVITS-MIN/LUT (CENTRUM SILVER ORAL) Take 1 tablet by mouth daily.       LIPITOR 20 mg tablet Take 1 tablet by mouth  daily 90 tablet 3     naproxen sodium (ALEVE) 220 MG tablet Take 220 mg by mouth 2 (two) times a day with meals.       tamsulosin (FLOMAX) 0.4 mg Cp24 Take 1 capsule by mouth at  bedtime 90 capsule 3     zinc 50 mg Tab Take 1 tablet by mouth daily.       No current facility-administered medications for this visit.      Allergies   Allergen Reactions     Nuts - Unspecified Itching     Other reaction(s): Tongue Swelling  Brazil nuts     Social History   Substance Use Topics     Smoking status: Former Smoker     Smokeless tobacco: None     Alcohol use None

## 2021-06-14 NOTE — TELEPHONE ENCOUNTER
Refill Approved    Rx renewed per Medication Renewal Policy. Medication was last renewed on 11/16/20.    Nida Silverio, [facility] Triage/Med Refill 2/2/2021     Requested Prescriptions   Pending Prescriptions Disp Refills     atenoloL (TENORMIN) 25 MG tablet [Pharmacy Med Name: ATENOLOL  25MG  TAB] 90 tablet 3     Sig: TAKE 1 TABLET BY MOUTH  DAILY       Beta-Blockers Refill Protocol Passed - 2/1/2021  8:22 PM        Passed - PCP or prescribing provider visit in past 12 months or next 3 months     Last office visit with prescriber/PCP: 11/20/2019 Alejandro Redd MD OR same dept: Visit date not found OR same specialty: 11/20/2019 Alejandro Redd MD  Last physical: 9/17/2020 Last MTM visit: Visit date not found   Next visit within 3 mo: Visit date not found  Next physical within 3 mo: Visit date not found  Prescriber OR PCP: Alejandro Redd MD  Last diagnosis associated with med order: 1. Hypertension  - atenoloL (TENORMIN) 25 MG tablet [Pharmacy Med Name: ATENOLOL  25MG  TAB]; TAKE 1 TABLET BY MOUTH  DAILY  Dispense: 90 tablet; Refill: 3    2. Serology Prostate-specific Antigen (PSA) Elevated  - tamsulosin (FLOMAX) 0.4 mg cap [Pharmacy Med Name: TAMSULOSIN  0.4MG  CAP]; TAKE 1 CAPSULE BY MOUTH AT  BEDTIME  Dispense: 90 capsule; Refill: 3    If protocol passes may refill for 12 months if within 3 months of last provider visit (or a total of 15 months).             Passed - Blood pressure filed in past 12 months     BP Readings from Last 1 Encounters:   09/17/20 124/70                tamsulosin (FLOMAX) 0.4 mg cap [Pharmacy Med Name: TAMSULOSIN  0.4MG  CAP] 90 capsule 3     Sig: TAKE 1 CAPSULE BY MOUTH AT  BEDTIME       Alfuzosin/Tamsulosin/Silodosin Refill Protocol  Passed - 2/1/2021  8:22 PM        Passed - PCP or prescribing provider visit in past 12 months       Last office visit with prescriber/PCP: 11/20/2019 Alejandro Redd MD OR same dept: Visit date not found OR same specialty: 11/20/2019 Alejandro Redd  MD  Last physical: 9/17/2020 Last MTM visit: Visit date not found   Next visit within 3 mo: Visit date not found  Next physical within 3 mo: Visit date not found  Prescriber OR PCP: Alejandro Redd MD  Last diagnosis associated with med order: 1. Hypertension  - atenoloL (TENORMIN) 25 MG tablet [Pharmacy Med Name: ATENOLOL  25MG  TAB]; TAKE 1 TABLET BY MOUTH  DAILY  Dispense: 90 tablet; Refill: 3    2. Serology Prostate-specific Antigen (PSA) Elevated  - tamsulosin (FLOMAX) 0.4 mg cap [Pharmacy Med Name: TAMSULOSIN  0.4MG  CAP]; TAKE 1 CAPSULE BY MOUTH AT  BEDTIME  Dispense: 90 capsule; Refill: 3    If protocol passes may refill for 12 months if within 3 months of last provider visit (or a total of 15 months).

## 2021-06-14 NOTE — PROGRESS NOTES
Scott Cherry is a 87 y.o. male who is being evaluated via a billable video visit.      How would you like to obtain your AVS? MyChart.  If dropped from the video visit, the video invitation should be resent by: Text to cell phone: [phone]   Will anyone else be joining your video visit? No      Video Start Time: 10:20 AM     [facility] video-virtual note    Scott Cherry   87 y.o. male    Date of Visit: 2/3/2021    Chief Complaint   Patient presents with     Leg Injury     Bruising and swelling to upper left leg since tripping on a cord and falling down in his garage 11 days ago     Abbi Velasquez is an 87-year-old male lives independently with wife with intact cognition. He is usually active with regular walking.    11 days ago he tripped in the garage on a cord, and fell on his left leg with a large bruise on his left thigh.    He was not doing his regular walking after that. He did have increase of his left lower extremity.    He does have chronic lower extremity and trace to +1 level with chronic lymphedema.    The left lower extremity edema has now been improving over the past couple of days. He denies significant calf pain, unless he squeezes it. No shortness of breath or chest pain.    He is able to walk after the fall. No hip pain or knee pain, and full range of motion on the knee and ankle. He has been walking with a walker now in his house but has not gotten back to the treadmill in the basement.    He has a past history of coronary artery disease with a non-Q wave MI in 2018 with 4 drug-eluting stents. He continues on the aspirin 325 mg alternating with 81 mg every other day.    I did review the cardiology note from November 2020. He was having some increasing heart block and some mild orthostasis and blood pressure was 112/78 with heart rate 60. He was told to stop his atenolol.    Patient states his blood pressure was recently 128/80 and no new issues since stopping the  atenolol.    He denies any lightheaded dizzy spells currently.    He does take Flomax for BPH.    He does not have syncope. No head trauma or other injury with fall.    No past history of DVT.    No flare of his chronic back pain. Previous laminectomy L2-5 in 2017    PMHx:    Past Medical History:   Diagnosis Date     Adenomatous colon polyp     in 2014, 5 year follow up     BPH (benign prostatic hyperplasia)     on flomax     CAD (coronary artery disease)     BMS LAD '01/ neg stress test '07/ no h/o MI     COPD (chronic obstructive pulmonary disease) (H)     mild/ former smoker     History of basal cell carcinoma of skin     yearly exam in Sept.     HTN (hypertension)      Hypercholesteremia     controlled on lipitor     Prostate cancer (H)     heriberto 3+3 on 4/14 bx.  No Tx yet, watchful waiting.      Spinal stenosis     severe L3-4, mod L4-5     PSHx:  No past surgical history on file.  Immunizations:   Immunization History   Administered Date(s) Administered     Influenza high dose,seasonal,PF, 65+ yrs 10/24/2018     Influenza, inj, historic,unspecified 09/30/2009, 10/07/2010     Influenza,quad,high Dose,PF, 65yr + 10/18/2020     Pneumo Conj 13-V (2010&after) 04/27/2015     Pneumo Polysac 23-V 01/01/2004, 06/23/2010     Td,adult,historic,unspecified 03/05/2008     Tdap 07/25/2012     ZOSTER, LIVE 01/21/2009     ZOSTER, RECOMBINANT, IM 09/19/2019, 12/06/2019       ROS A comprehensive review of systems was performed and was otherwise negative    Medications, allergies, and problem list were reviewed and updated    Exam  There were no vitals taken for this visit.  On video he does have a large bruise on the medial aspect of his left thigh. The bruise does extend to the knee and upper lower leg. He did squeeze his calf but did not report significant pain, just some mild achiness but that was an area where there was a bruise. There is +1-2 lower extremity edema to the knee which is slightly increased from baseline.  There is no erythema to the skin or evidence of skin break. He has good range of motion of the knee.    Assessment/Plan  1. Localized swelling of left lower leg  Increase lower extremity edema on top of his chronic lymphedema, secondary to bruise on left thigh and some increased sitting afterwards.    Bruising pattern and edema pattern do appear to be dependent below the bruise consistent with gravitational migration of his edema from the injury.    We did discuss risk of DVT blood clot. I did discuss option of patient to go to the ER now to get an ultrasound of his left lower extremity to rule out a DVT. But we discussed the risk with COVID-19 outbreak, patient does not wish to go to the ER at this time.    Patient was given warnings to seek medical attention right away if increasing shortness of breath or chest pain.    He will continue to increase his regular walking. He will elevate the leg. Patient was told to stop the cold packs. Increase range of motion and walking. Anticipate improvement over the next week.    He will continue on his current aspirin    Continue to use walker for ambulation until balance sufficiently improves    2. Coronary atherosclerosis due to calcified coronary lesion  Asymptomatic. Tolerating discontinuation of atenolol, which was stopped because of lower heart rate and increasing pure interval with reported some mild orthostasis.    Blood pressure still controlled after discontinuation of atenolol.    Follow-up with me on April 26    Return in 7 weeks (on 3/26/2021) for Recheck.   Patient Instructions   Keep leg elevated. Increase regular walking. This will help reduce the leg swelling.    Stay on your current aspirin.    If you do develop sudden shortness of breath or chest pain, go to the emergency room right away to get evaluation for a blood clot.        Alejandro Redd MD        Current Outpatient Medications   Medication Sig Dispense Refill     ascorbic acid, vitamin C, (ASCORBIC  ACID WITH HERMILA HIPS) 500 MG tablet Take 500 mg by mouth daily.       aspirin 325 MG tablet Take 325 mg by mouth every other day.       aspirin 81 MG EC tablet Take 81 mg by mouth every other day.              cholecalciferol, vitamin D3, (VITAMIN D3) 1,000 unit capsule Take 1,000 Units by mouth daily.              fish oil-dha-epa 1,200-144-216 mg cap Take 1,000 mg by mouth daily .             fluticasone propionate (FLONASE) 50 mcg/actuation nasal spray Use 1 spray nasally two  times daily as needed for  hay fever 48 g 11     FOLIC ACID/MULTIVITS-MIN/LUT (CENTRUM SILVER ORAL) Take 1 tablet by mouth daily.       LIPITOR 20 mg tablet TAKE 1 TABLET BY MOUTH  DAILY 90 tablet 3     naproxen sodium (ALEVE) 220 MG tablet Take 220 mg by mouth as needed .             nitroglycerin (NITROSTAT) 0.4 MG SL tablet DISSOLVE 1 TABLET UNDER THE TONGUE EVERY 5 MINUTES AS  NEEDED FOR CHEST PAIN (MAX  3 TABS IN 15 MINUTES, CALL  911 IF CHEST PAIN PERSISTS) 50 tablet 0     tamsulosin (FLOMAX) 0.4 mg cap TAKE 1 CAPSULE BY MOUTH AT  BEDTIME 90 capsule 1     zinc 50 mg Tab Take 1 tablet by mouth daily.       No current facility-administered medications for this visit.      Allergies   Allergen Reactions     Nuts - Unspecified Itching     Other reaction(s): Tongue Swelling  Brazil nuts     Social History     Tobacco Use     Smoking status: Former Smoker     Smokeless tobacco: Never Used   Substance Use Topics     Alcohol use: Not on file     Drug use: Not on file           Video-Visit Details    Type of service:  Video Visit    Video End Time (time video stopped): 10:30 AM  Originating Location (pt. Location): Home    Distant Location (provider location):  [facility]     Platform used for Video Visit: Stephanie[employer]

## 2021-06-15 NOTE — TELEPHONE ENCOUNTER
Refill Request  Medication name:   Requested Prescriptions     Pending Prescriptions Disp Refills     nitroglycerin (NITROSTAT) 0.4 MG SL tablet 50 tablet 0     Sig: DISSOLVE 1 TABLET UNDER THE TONGUE EVERY 5 MINUTES AS  NEEDED FOR CHEST PAIN (MAX  3 TABS IN 15 MINUTES, CALL  911 IF CHEST PAIN PERSISTS)     Who prescribed the medication: PCP  Last refill on medication: 1/27/20  [facility]  Last appointment with PCP: 2/3/21  Next appointment: Appointment scheduled for 3/26/21    Kathi Mendez, [facility]

## 2021-06-15 NOTE — TELEPHONE ENCOUNTER
Refill Approved    Rx renewed per Medication Renewal Policy. Medication was last renewed on 1/12/20, last OV 2/3/21.    Rachel Boles, [facility] Triage/Med Refill 2/24/2021     Requested Prescriptions   Pending Prescriptions Disp Refills     LIPITOR 20 mg tablet [Pharmacy Med Name: LIPITOR  20MG  TAB] 90 tablet 3     Sig: TAKE 1 TABLET BY MOUTH  DAILY       Statins Refill Protocol (Hmg CoA Reductase Inhibitors) Passed - 2/24/2021  3:14 PM        Passed - PCP or prescribing provider visit in past 12 months      Last office visit with prescriber/PCP: 11/20/2019 Alejandro Redd MD OR same dept: Visit date not found OR same specialty: 11/20/2019 Alejandro Redd MD  Last physical: 9/17/2020 Last MTM visit: Visit date not found   Next visit within 3 mo: Visit date not found  Next physical within 3 mo: Visit date not found  Prescriber OR PCP: Alejandro Redd MD  Last diagnosis associated with med order: 1. Hypercholesterolemia  - LIPITOR 20 mg tablet [Pharmacy Med Name: LIPITOR  20MG  TAB]; TAKE 1 TABLET BY MOUTH  DAILY  Dispense: 90 tablet; Refill: 3    If protocol passes may refill for 12 months if within 3 months of last provider visit (or a total of 15 months).

## 2021-06-16 PROBLEM — H90.3 SENSORINEURAL HEARING LOSS, BILATERAL: Status: ACTIVE | Noted: 2017-09-13

## 2021-06-16 NOTE — PATIENT INSTRUCTIONS - HE
Continue with current furosemide 20 mg a day.  If your legs do swell more, you can increase the furosemide to 40 mg in a day.  If you are using a higher dose of furosemide more than once a week, contact me to get reevaluated sooner.    Otherwise I will see you in late September or October for your adult wellness visit physical exam.    Continue to walk on a daily basis and do your back exercises on a daily basis.

## 2021-06-16 NOTE — PROGRESS NOTES
[facility] Follow Up Note    Scott Cherry   87 y.o. male    Date of Visit: 4/19/2021    Chief Complaint   Patient presents with     Follow-up     Subjective  Ron is here for follow-up on his lower extremity edema and use of furosemide.    Last month I had him start on furosemide 20 mg a day for lower extremity edema which has helped some but still significant edema and weight is stable.    He states the edema does not bother him significantly, there is no skin breakdown or new redness or itching.    He does have some mild unsteadiness on his feet with back asymmetry after laminectomy in 2017 L2-5, uses a cane.  Has not had a new fall.  He is doing walking daily on the treadmill, but does not always do his back exercises.  No exacerbation of the back pain or new radicular pain.    He denies lightheaded dizzy spells.  Denies any side effects after starting furosemide 20 mg a day.  Is not on other blood pressure medication.    2018 heart echo with mild diastolic dysfunction otherwise normal.  He denies worsening shortness of breath.  No palpitations or chest pain.    I did review labs from March 2021 with a creatinine of 1.0.    Prostate cancer 3+3 post radiation finished last September.  His urination has improved since radiation.  He still on Flomax.  He just saw a radiation oncology earlier this month and he states his PSA was down to 1.4.  He has a 4-month follow-up plan.    No chest pain.  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  On Lipitor and aspirin.    PMHx:    Past Medical History:   Diagnosis Date     Adenomatous colon polyp     in 2014, 5 year follow up     BPH (benign prostatic hyperplasia)     on flomax     CAD (coronary artery disease)     BMS LAD '01/ neg stress test '07/ no h/o MI     COPD (chronic obstructive pulmonary disease) (H)     mild/ former smoker     History of basal cell carcinoma of skin     yearly exam in Sept.     HTN  (hypertension)      Hypercholesteremia     controlled on lipitor     Prostate cancer (H)     heriberto 3+3 on 4/14 bx.  No Tx yet, watchful waiting.      Spinal stenosis     severe L3-4, mod L4-5     PSHx:  No past surgical history on file.  Immunizations:   Immunization History   Administered Date(s) Administered     COVID-19,PF,Pfizer 02/19/2021, 03/12/2021     Influenza high dose,seasonal,PF, 65+ yrs 10/24/2018     Influenza, inj, historic,unspecified 09/30/2009, 10/07/2010     Influenza,quad,high Dose,PF, 65yr + 10/18/2020     Pneumo Conj 13-V (2010&after) 04/27/2015     Pneumo Polysac 23-V 01/01/2004, 06/23/2010     Td,adult,historic,unspecified 03/05/2008     Tdap 07/25/2012     ZOSTER, LIVE 01/21/2009     ZOSTER, RECOMBINANT, IM 09/19/2019, 12/06/2019       ROS A comprehensive review of systems was performed and was otherwise negative    Medications, allergies, and problem list were reviewed and updated    Exam  /74   Pulse 76   Wt (!) 234 lb 9.6 oz (106.4 kg)   BMI 34.90 kg/m    Lungs are clear.  Heart is regular.  He does have an unsteady gait with back asymmetry.  He does have still +1-2 lower extremity edema bilaterally, but no skin breakdown or erythema or stasis dermatitis.  Abdomen nontender.    Assessment/Plan  1. Coronary atherosclerosis due to calcified coronary lesion  Asymptomatic.  Continue aspirin and Lipitor.  History of stents.    2. Bilateral lower extremity edema  Still significant edema, slightly better.  Tolerating current Lasix.  Patient was told he could take up to 40 mg of furosemide if any worsening leg swelling.  Otherwise follow-up this fall for his physical.    He did not want to use compression stockings.    Related to venous insufficiency, likely pelvic insufficiency post pelvic radiation for his prostate cancer exacerbating.    Continue to walk on treadmill daily.    Does not have worsening shortness of breath or signs of worsening diastolic congestive heart failure at  this time.  - furosemide (LASIX) 20 MG tablet; Take 1 tablet (20 mg total) by mouth daily.  Dispense: 90 tablet; Refill: 2    3. Encounter for therapeutic drug monitoring    - Basic Metabolic Panel    4. History of prostate cancer  Follow-up with radiation oncology in 4 months.    Still takes Flomax for BPH.    Unsteady gait with back asymmetry and chronic back pain.  I emphasized the importance of his daily walking and back exercises.  Could restart physical therapy and use walker in the future if needed.    Return in about 6 months (around 10/19/2021) for Annual physical.   Patient Instructions   Continue with current furosemide 20 mg a day.  If your legs do swell more, you can increase the furosemide to 40 mg in a day.  If you are using a higher dose of furosemide more than once a week, contact me to get reevaluated sooner.    Otherwise I will see you in late September or October for your adult wellness visit physical exam.    Continue to walk on a daily basis and do your back exercises on a daily basis.    Alejandro Redd MD        Current Outpatient Medications   Medication Sig Dispense Refill     ascorbic acid, vitamin C, (ASCORBIC ACID WITH HERMILA HIPS) 500 MG tablet Take 500 mg by mouth daily.       aspirin 325 MG tablet Take 325 mg by mouth every other day.       aspirin 81 MG EC tablet Take 81 mg by mouth every other day.              cholecalciferol, vitamin D3, (VITAMIN D3) 1,000 unit capsule Take 1,000 Units by mouth daily.              fish oil-dha-epa 1,200-144-216 mg cap Take 1,000 mg by mouth daily .             fluticasone propionate (FLONASE) 50 mcg/actuation nasal spray Use 1 spray nasally two  times daily as needed for  hay fever 48 g 11     FOLIC ACID/MULTIVITS-MIN/LUT (CENTRUM SILVER ORAL) Take 1 tablet by mouth daily.       furosemide (LASIX) 20 MG tablet Take 1 tablet (20 mg total) by mouth daily. 90 tablet 2     LIPITOR 20 mg tablet Take 1 tablet (20 mg total) by mouth daily. 90 tablet 3      naproxen sodium (ALEVE) 220 MG tablet Take 220 mg by mouth as needed .             nitroglycerin (NITROSTAT) 0.4 MG SL tablet DISSOLVE 1 TABLET UNDER THE TONGUE EVERY 5 MINUTES AS  NEEDED FOR CHEST PAIN (MAX  3 TABS IN 15 MINUTES, CALL  911 IF CHEST PAIN PERSISTS) 50 tablet 0     tamsulosin (FLOMAX) 0.4 mg cap TAKE 1 CAPSULE BY MOUTH AT  BEDTIME 90 capsule 1     zinc 50 mg Tab Take 1 tablet by mouth daily.       No current facility-administered medications for this visit.      Allergies   Allergen Reactions     Nuts - Unspecified Itching     Other reaction(s): Tongue Swelling  Brazil nuts     Social History     Tobacco Use     Smoking status: Former Smoker     Smokeless tobacco: Never Used   Substance Use Topics     Alcohol use: Not on file     Drug use: Not on file

## 2021-06-16 NOTE — PATIENT INSTRUCTIONS - HE
Start furosemide 20 mg once a day to help reduce leg swelling.  You can take this in the morning or afternoon.    If you develop significant lightheaded dizzy spells, contact me.    Follow-up with me in 2 to 3 weeks to recheck kidney labs, potassium and blood pressure.    You could consider compression stockings for your legs to help keep the swelling down.    Continue daily walking.    I will be checking kidney labs and potassium are baseline checked today.

## 2021-06-16 NOTE — PROGRESS NOTES
[facility] Follow Up Note    Scott Cherry   87 y.o. male    Date of Visit: 3/26/2021    Chief Complaint   Patient presents with     Follow-up     6 month follow up     Subjective  Ron is here for a checkup on his cardiac condition and chronic lower extremity edema which has been present for a number of years but has gotten somewhat worse recently.    I did review labs from September 2020 with a stable creatinine of 1.2.  His potassium generally is in the high fours.    He stopped atenolol last year because of lower heart rate and lower blood pressure.    His blood pressures been up some but usually in the 130s over 70s on his home checks.  He denies orthostasis.    He did have a fall in January in the garage when he tripped.  He is not had any further falls.  He continues to walk on the treadmill 30 minutes a day with good exertional ability.  No chest pain or increasing shortness of breath.    2018 heart echo with ejection fraction 55-60% with mild aortic insufficiency and grade 1 diastolic dysfunction.    No chest pain.  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  Still on aspirin.    He has prostate cancer post radiation last year.  Saw radiation oncology in December with follow-up in April.  He has every 2 hour urination but no acute change in urination.    Chronic low back pain, sometimes bothers him with yard work.  He had a previous laminectomy L2-5 in 2017.  No new leg claudication.    Past history of basal cell cancer.    No new cough or fever.  Get the second Covid vaccine.    PMHx:    Past Medical History:   Diagnosis Date     Adenomatous colon polyp     in 2014, 5 year follow up     BPH (benign prostatic hyperplasia)     on flomax     CAD (coronary artery disease)     BMS LAD '01/ neg stress test '07/ no h/o MI     COPD (chronic obstructive pulmonary disease) (H)     mild/ former smoker     History of basal cell carcinoma of skin     yearly exam in Sept.     HTN  "(hypertension)      Hypercholesteremia     controlled on lipitor     Prostate cancer (H)     heriberto 3+3 on 4/14 bx.  No Tx yet, watchful waiting.      Spinal stenosis     severe L3-4, mod L4-5     PSHx:  No past surgical history on file.  Immunizations:   Immunization History   Administered Date(s) Administered     COVID-19,PF,Pfizer 02/19/2021, 03/12/2021     Influenza high dose,seasonal,PF, 65+ yrs 10/24/2018     Influenza, inj, historic,unspecified 09/30/2009, 10/07/2010     Influenza,quad,high Dose,PF, 65yr + 10/18/2020     Pneumo Conj 13-V (2010&after) 04/27/2015     Pneumo Polysac 23-V 01/01/2004, 06/23/2010     Td,adult,historic,unspecified 03/05/2008     Tdap 07/25/2012     ZOSTER, LIVE 01/21/2009     ZOSTER, RECOMBINANT, IM 09/19/2019, 12/06/2019       ROS A comprehensive review of systems was performed and was otherwise negative    Medications, allergies, and problem list were reviewed and updated    Exam  /80   Pulse 70   Temp 97.1  F (36.2  C)   Ht 5' 8.75\" (1.746 m)   Wt (!) 234 lb (106.1 kg)   SpO2 98%   BMI 34.81 kg/m    Blood pressure rechecked by me was 136/70.  Lungs are clear to auscultation.  Heart is regular without murmur.  Abdomen is nontender, mildly overweight.  Weight is up just 4 pounds.  He does have +1-2 lower extremity edema to the knee bilaterally that is somewhat worse.  Still able to climb up on the exam table.  Abdomen nontender.    Assessment/Plan  1. Bilateral lower extremity edema  Increasing edema.  Likely progressive venous insufficiency.  He does have a history of diastolic dysfunction.    Start Lasix.  Patient was warned about risk of affecting kidney function, potassium, dehydration and syncope risk.  Patient sepsis risk and wishes to proceed.    Follow-up in 2 to 3 weeks for recheck.  - furosemide (LASIX) 20 MG tablet; Take 1 tablet (20 mg total) by mouth daily.  Dispense: 30 tablet; Refill: 4    2. Coronary atherosclerosis due to calcified coronary " lesion  Asymptomatic.  Good exertional ability.  Continue aspirin daily.  Lipitor.    3. Prostate cancer (H)  Post radiation.  Follow-up with radiation oncology next month.    4. Chronic bilateral low back pain without sciatica  Previous laminectomy.  Chronic back achiness.  I recommended against a back brace but he could use 1 as needed.  Continue regular daily walking.  Work on weight loss.  Al-Anon as needed.  He has used Aleve in the past    5. Encounter for therapeutic drug monitoring    - Comprehensive Metabolic Panel    History of basal cell cancer.    BPH on Flomax    His left leg feels better after injury last January    Return in about 3 weeks (around 4/16/2021) for Recheck.   Patient Instructions   Start furosemide 20 mg once a day to help reduce leg swelling.  You can take this in the morning or afternoon.    If you develop significant lightheaded dizzy spells, contact me.    Follow-up with me in 2 to 3 weeks to recheck kidney labs, potassium and blood pressure.    You could consider compression stockings for your legs to help keep the swelling down.    Continue daily walking.    I will be checking kidney labs and potassium are baseline checked today.    Alejandro Redd MD        Current Outpatient Medications   Medication Sig Dispense Refill     ascorbic acid, vitamin C, (ASCORBIC ACID WITH HERMILA HIPS) 500 MG tablet Take 500 mg by mouth daily.       aspirin 325 MG tablet Take 325 mg by mouth every other day.       aspirin 81 MG EC tablet Take 81 mg by mouth every other day.              cholecalciferol, vitamin D3, (VITAMIN D3) 1,000 unit capsule Take 1,000 Units by mouth daily.              fish oil-dha-epa 1,200-144-216 mg cap Take 1,000 mg by mouth daily .             fluticasone propionate (FLONASE) 50 mcg/actuation nasal spray Use 1 spray nasally two  times daily as needed for  hay fever 48 g 11     FOLIC ACID/MULTIVITS-MIN/LUT (CENTRUM SILVER ORAL) Take 1 tablet by mouth daily.       LIPITOR 20 mg  tablet Take 1 tablet (20 mg total) by mouth daily. 90 tablet 3     naproxen sodium (ALEVE) 220 MG tablet Take 220 mg by mouth as needed .             nitroglycerin (NITROSTAT) 0.4 MG SL tablet DISSOLVE 1 TABLET UNDER THE TONGUE EVERY 5 MINUTES AS  NEEDED FOR CHEST PAIN (MAX  3 TABS IN 15 MINUTES, CALL  911 IF CHEST PAIN PERSISTS) 50 tablet 0     tamsulosin (FLOMAX) 0.4 mg cap TAKE 1 CAPSULE BY MOUTH AT  BEDTIME 90 capsule 1     zinc 50 mg Tab Take 1 tablet by mouth daily.       furosemide (LASIX) 20 MG tablet Take 1 tablet (20 mg total) by mouth daily. 30 tablet 4     No current facility-administered medications for this visit.      Allergies   Allergen Reactions     Nuts - Unspecified Itching     Other reaction(s): Tongue Swelling  Brazil nuts     Social History     Tobacco Use     Smoking status: Former Smoker     Smokeless tobacco: Never Used   Substance Use Topics     Alcohol use: Not on file     Drug use: Not on file

## 2021-06-17 NOTE — PROGRESS NOTES
"Assessment/Plan:        1. Hoarseness     2. Postnasal drip       Most probably symptoms of scratchy throat is related to postnasal drainage.  He denies any symptoms of GERD.  She denies any typical anginal symptoms.  He denies cough or fever or any sign of respiratory infection.    He will take allergy medications and start using Flonase and nasal saline, gargling salty water and follow-up with the primary next week if not better.    This note has been dictated using voice recognition software. Any grammatical or context distortions are unintentional and inherent to the software.      Return in about 1 week (around 4/18/2018) for Recheck.    Patient Instructions   Start taking Allegra daily and nasal spray Flonase.   You can use nasal saline many times a day.  Follow up next week if not better.          Subjective:    Scott Cherry is a 84 y.o. male  here for    Chief Complaint   Patient presents with     Hoarse     hoarse throat, going on for 3 days, feels \"dry\" denies cough     84-year-old male with history of coronary artery disease he is presenting today with the scratchy throat since last Saturday.  For the last 5 days he has a mild soreness and feeling of postnasal drainage but no ear pain, runny nose, fever, chills or cough.  Occasionally she does feel some burning in upper chest but not really chest pain or tightness.  Nothing similar to the chest pain that he had before the stent placement years ago.  He does not feel sick.  No sick contacts.  He does have history of allergies that happen usually at this time of the year when he in the past use Flonase.  He did not try Flonase now and he is not taking any allergy medications.    Social History     Social History     Marital status:      Spouse name: N/A     Number of children: N/A     Years of education: N/A     Occupational History     Not on file.     Social History Main Topics     Smoking status: Former Smoker     Smokeless tobacco: Not on " file     Alcohol use Not on file     Drug use: Not on file     Sexual activity: Not on file     Other Topics Concern     Not on file     Social History Narrative       No family history on file.  Review of Systems:     A 12 point comprehensive review of systems was negative except as noted in HPI.            Objective:    Physical Exam   /74  Pulse 60  Wt (!) 242 lb (109.8 kg)  BMI 35.74 kg/m2    Constitutional: oriented to person, place, and time, appears well-nourished. No distress.   HENT:   Head: Normocephalic.   Mouth/Throat: Oropharynx is clear and moist.   Eyes: Conjunctivae are normal.   Neck:  Neck supple.   Cardiovascular: Normal rate, regular rhythm and normal heart sounds.    Pulmonary/Chest: Effort normal and breath sounds normal.  Abdominal: Soft.   Musculoskeletal: Normal range of motion.   Neurological: alert and oriented to person, place, and time.  Psychiatric:  normal mood and affect.    Patient Active Problem List   Diagnosis     Foot Pain (Soft Tissue)     Essential Hypercholesterolemia     Coronary Artery Disease     Serology Prostate-specific Antigen (PSA) Elevated     Cough     Acute Gingivitis     Aphthous Ulcer     Benign Adenomatous Polyp Of The Large Intestine     Unspecified cataract     Prostate cancer     Spinal stenosis, lumbar     Medication monitoring encounter     History of squamous cell carcinoma of skin     Sensorineural hearing loss, bilateral       Current Outpatient Prescriptions on File Prior to Visit   Medication Sig Dispense Refill     ascorbic acid, vitamin C, (ASCORBIC ACID WITH HERMILA HIPS) 500 MG tablet Take 500 mg by mouth daily.       aspirin 325 MG tablet Take 325 mg by mouth every other day. Alternates with 81mg       aspirin 81 MG EC tablet Take 81 mg by mouth every other day. Alternates with 325mg       atenolol (TENORMIN) 25 MG tablet Take 1 tablet (25 mg total) by mouth daily. 90 tablet 0     cholecalciferol, vitamin D3, (VITAMIN D3) 1,000 unit capsule  Take 2 capsules daily       fish oil-dha-epa 1,200-144-216 mg cap Take 1 capsule by mouth daily.       fluticasone (FLONASE) 50 mcg/actuation nasal spray Use 1 spray nasally two  times daily as needed for  hay fever 48 g 11     FOLIC ACID/MULTIVITS-MIN/LUT (CENTRUM SILVER ORAL) Take 1 tablet by mouth daily.       LIPITOR 20 mg tablet Take 1 tablet (20 mg total) by mouth daily. 90 tablet 0     naproxen sodium (ALEVE) 220 MG tablet Take 220 mg by mouth 2 (two) times a day with meals.       tamsulosin (FLOMAX) 0.4 mg Cp24 Take 1 capsule (0.4 mg total) by mouth at bedtime. 90 capsule 0     zinc 50 mg Tab Take 1 tablet by mouth daily.       No current facility-administered medications on file prior to visit.                Ankit Mast  4/11/2018

## 2021-06-17 NOTE — PATIENT INSTRUCTIONS - HE
Patient Instructions by Alejandro Redd MD at 5/20/2019  8:20 AM     Author: Alejandro Redd MD Service: -- Author Type: Physician    Filed: 5/20/2019  8:41 AM Encounter Date: 5/20/2019 Status: Addendum    : Alejandro Redd MD (Physician)    Related Notes: Original Note by Alejandro Redd MD (Physician) filed at 5/20/2019  8:41 AM       Continue current medications.  Discuss with cardiology about stopping the prasugrel/Effient.  You will continue on the aspirin daily.  I placed a referral to cardiology to have them contact you to make an appointment.    Continue regular walking.  Try to walk at least 20 minutes every day.    Follow-up in 6 months for routine checkup.    I would not recommend further colon cancer screening for you.    See dermatology next month as planned.    See ophthalmology in August for yearly checkup as planned.  Patient Education   Understanding Advance Care Planning  Advance care planning is the process of deciding ones own future medical care. It helps ensure that if you cant speak for yourself, your wishes can still be carried out. The plan is a series of legal documents that note a persons wishes. The documents vary by state. Advance care planning may be done when a person has a serious illness that is expected to get worse. It may be done before major surgery. And it can help you and your family be prepared in case of a major illness or injury. Advance care planning helps with making decisions at these times.       A health care proxy is a person who acts as the voice of a patient when the patient cant speak for himself or herself. The name of this role varies by state. It may be called a Durable Medical Power of  or Durable Power of  for Healthcare. It may be called an agent, surrogate, or advocate. Or it may be called a representative or decision maker. It is an official duty that is identified by a legal document. The document also varies by state.    Why Is  Advance Care Planning Important?  If a person communicates their healthcare wishes:    They will be given medical care that matches their values and goals.    Their family members will not be forced to make decisions in a crisis with no guidance.  Creating a Plan  Making an advance care plan is often done in 3 steps:    Thinking about ones wishes. To create an advance care plan, you should think about what kind of medical treatment you would want if you lose the ability to communicate. Are there any situations in which you would refuse or stop treatment? Are there therapies you would want or not want? And whom do you want to make decisions for you? There are many places to learn more about how to plan for your care. Ask your doctor or  for resources.    Picking a health care proxy. This means choosing a trusted person to speak for you only when you cant speak for yourself. When you cannot make medical decisions, your proxy makes sure the instructions in your advance care plan are followed. A proxy does not make decisions based on his or her own opinions. They must put aside those opinions and values if needed, and carry out your wishes.    Filling out the legal documents. There are several kinds of legal documents for advance care planning. Each one tells health care providers your wishes. The documents may vary by state. They must be signed and may need to be witnessed or notarized. You can cancel or change them whenever you wish. Depending on your state, the documents may include a Healthcare Proxy form, Living Will, Durable Medical Power of , Advance Directive, or others.  The Familys Role  The best help a family can give is to support their loved ones wishes. Open and honest communication is vital. Family should express any concerns they have about the patients choices while the patient can still make decisions.    [id number] [employer]. [address], [place]  [address]. All rights reserved. This information is not intended as a substitute for professional medical care. Always follow your healthcare professional's instructions.         Also, [facility] offers a free, downloadable health care directive that allows you to share your treatment choices and personal preferences if you cannot communicate your wishes. It also allows you to appoint another person (called a health care agent) to make health care decisions if you are unable to do so. You can download an advance directive by going here: [email or web address]     Patient Education   Personalized Prevention Plan  You are due for the preventive services outlined below.  Your care team is available to assist you in scheduling these services.  If you have already completed any of these items, please share that information with your care team to update in your medical record.  Health Maintenance   Topic Date Due   ? ZOSTER VACCINES (2 of 3) 03/18/2009   ? FALL RISK ASSESSMENT  04/25/2019   ? ADVANCE DIRECTIVES DISCUSSED WITH PATIENT  02/22/2022   ? TD 18+ HE  07/25/2022   ? PNEUMOCOCCAL POLYSACCHARIDE VACCINE AGE 65 AND OVER  Completed   ? INFLUENZA VACCINE RULE BASED  Completed   ? PNEUMOCOCCAL CONJUGATE VACCINE FOR ADULTS (PCV13 OR PREVNAR)  Completed

## 2021-06-17 NOTE — TELEPHONE ENCOUNTER
RN cannot approve Refill Request    RN can NOT refill this medication med is not covered by policy/route to provider. Last office visit: 4/19/2021 Alejandro Redd MD Last Physical: 9/17/2020 Last MTM visit: Visit date not found Last visit same specialty: 4/19/2021 Alejandro Redd MD.  Next visit within 3 mo: Visit date not found  Next physical within 3 mo: Visit date not found      Eula Bowen, [facility] Triage/Med Refill 5/7/2021    Requested Prescriptions   Pending Prescriptions Disp Refills     nitroglycerin (NITROSTAT) 0.4 MG SL tablet [Pharmacy Med Name: NITROGLYCERIN 0.4MG SUBLINGUAL 25S] 50 tablet 3     Sig: DISSOLVE 1 TABLET UNDER THE TONGUE EVERY 5 MINUTES AS  NEEDED FOR CHEST PAIN. MAX  OF 3 TABLETS IN 15 MINUTES. CALL 911 IF PAIN PERSISTS.       There is no refill protocol information for this order

## 2021-06-17 NOTE — PROGRESS NOTES
"Cardiac Rehab  Phase II Assessment    Assessment Date: 5-11-18    Procedure: PCI BRITTANEY X4 to LAD and LCx  Date of Onset: 4-13-18  ICD/Pacemaker: No   Post-op Complications: None  ECG History: Sinus Rhythm, 1st Degree AVB EF%:55-60  Past Medical History:   Patient Active Problem List   Diagnosis     Foot Pain (Soft Tissue)     Essential Hypercholesterolemia     Coronary Artery Disease     Serology Prostate-specific Antigen (PSA) Elevated     Cough     Acute Gingivitis     Aphthous Ulcer     Benign Adenomatous Polyp Of The Large Intestine     Unspecified cataract     Prostate cancer     Spinal stenosis, lumbar     Medication monitoring encounter     History of squamous cell carcinoma of skin     Sensorineural hearing loss, bilateral         Physical Assessment  Precautions/ Physical Limitations: Sore Feet-pt believes it is peripheral neuropathy  Oxygen: No  O2 Sats: 97-98% on RA Lung Sounds: Clear Edema: None  Sleeping Pattern: good   Appetite: good   Nutrition Risk Screen: Will follow up with dietician    Pain  Location: Feet  Characteristics:Sore  Intensity: (0-10 scale) 3  Current Pain Management: NA  Intervention: NA  Response: NA    Psychosocial/ Emotional Health  1. In the past 12 months, have you been in a relationship where you have been abused physically, emotionally, sexually or financially? No  notified: No  2. Who do you turn to for emotional support?: Wife-Corinne  3. Do you have cultural or spiritual needs? No  4. Have there been any major life changes in the past 12 months? No    Referral Information  Primary Physician: Alejandro Redd MD  Cardiologist: Dr. Kelley      Home exercise/Equipment: Treadmill, Airdyne    Patient's long-term goal(s): \"Keep on going the way I am\"    1. Living Accommodations: Home Steps: Yes      Support people at home: Wife and son   2. Marital Status:   3. Family is able to assist with cares      Yazidism/Community involvement: Attend and count money  4. " Recreation/Hobbies: Play on computer, Gardening, Fishing, Driving trips

## 2021-06-17 NOTE — PROGRESS NOTES
[facility] Clinic Follow Up Note    Scott Cherry   84 y.o. male    Date of Visit: 4/25/2018    Chief Complaint   Patient presents with     Hospital Visit Follow Up     4 stents placed     Subjective  Scott is here for follow-up after admission to the hospital for a small acute MI.    Patient was having 3-4 days of mild upper chest discomfort feeling, constant.  Some mild shortness of breath feeling.  He had a peak troponin I of 1.7.    He has known coronary artery disease with a bare-metal stent of the LAD in 2001.  No prior MI.  Generally 2017 stress test with a mild apical lateral ischemic area but cardiology felt low risk and no intervention at that time.    He had been on Lipitor with excellent cholesterol control last November with LDL 57 HDL 51.  He had been on an aspirin daily.    He has very mild COPD that not been giving him a problem, former smoker.  Negative chest x-ray May 2016.    Hypertension is been controlled on atenolol 25 mg a day.    No increasing lower extremity edema, no other significant changes at that time, he sought medical attention for the chest pain, because of the MI he proceeded with angiogram.    On April 13 he had an angiogram with stent intervention.  He had minimal left main disease, 60% LAD stenosis proximal to the previous stent and a 95% LAD stenosis distal to the stent.  He had 2 drug-eluting stents overlapping with the old stent, one proximal and one distal.  He had another drug-eluting stent placed in the PDA and one in the circumflex.  He also had PTCA of the diagonal, and proximal OM, blood flow was good after procedure and no complications.    He is now on Effient and aspirin 81 mg.  I discussed the bleeding risk with those medications, but also the importance of staying on the extra antiplatelet medication over the next year.  There was some discussion about consideration to change to Plavix in 1 month and he sees cardiology on May 18.  Is a  cardiac echo scheduled for May 7.    No chest pain or increasing shortness of breath since stents.  His return home and return to normal activity.    He starts cardiac rehabilitation soon.    No flare of his lower back pain with lumbar decompressive surgery of L2-L5 March 2017.  He now has a treadmill and walking regularly.    Past history of prostate cancer with watchful waiting.  He states his PSA yesterday was 9.4, October 2017 PSA was 8.2, April 2017 PSA 7.3.  He has an appoint with urology on Monday.  No new urinary symptoms.  He is on Flomax.    Past history of skin cancer, he had a small skin cancer in his left neck, he believes it was a basal cell cancer that is healed well.  Follow-up in July with dermatology.    Bowels are regular.  He did have a polyp back in June 2014 colonoscopy with a 5 year follow-up plan.    They did recheck his creatinine the day after his angiogram on April 14 and a normal creatinine 1.0 with potassium 4.5.    Bowels are regular no melena no abdominal pain.  The groin catheterization site is healed up well.    PMHx:    Past Medical History:   Diagnosis Date     Adenomatous colon polyp     in 2014, 5 year follow up     BPH (benign prostatic hyperplasia)     on flomax     CAD (coronary artery disease)     BMS LAD '01/ neg stress test '07/ no h/o MI     COPD (chronic obstructive pulmonary disease)     mild/ former smoker     History of basal cell carcinoma of skin     yearly exam in Sept.     HTN (hypertension)      Hypercholesteremia     controlled on lipitor     Prostate cancer     heriberto 3+3 on 4/14 bx.  No Tx yet, watchful waiting.      Spinal stenosis     severe L3-4, mod L4-5     PSHx:  No past surgical history on file.  Immunizations:   Immunization History   Administered Date(s) Administered     Influenza, inj, historic,unspecified 09/30/2009, 10/07/2010     Pneumo Conj 13-V (2010&after) 04/27/2015     Pneumo Polysac 23-V 01/01/2004, 06/23/2010      "Td,adult,historic,unspecified 03/05/2008     Tdap 07/25/2012     ZOSTER, LIVE 01/21/2009       ROS A comprehensive review of systems was performed and was otherwise negative    Medications, allergies, and problem list were reviewed and updated    Exam  /80  Pulse (!) 55  Ht 5' 9\" (1.753 m)  Wt (!) 237 lb (107.5 kg)  SpO2 98%  BMI 35 kg/m2  Appears well.  No neurologic changes.  No jaundice.  Found on the exam table.  Lungs are clear.  No JVD.  Heart is regular without murmur, occasional extra beats.  No ankle edema, is at trace in the past.  Abdomen is nontender no hepatomegaly.    Assessment/Plan  1. Coronary atherosclerosis due to calcified coronary lesion  Now asymptomatic after 4 stents placed.  Small non-Q-wave MI.    Start cardiac rehab.    Now on Effient in addition to low-dose aspirin.    Follow-up with cardiology in May with cardiac echo as planned.    2. Prostate cancer  Watchful waiting, sees urology next week  Flomax  3. History of squamous cell carcinoma of skin  No recurrence at this time.  Six-month follow-up with dermatology in July    4. Essential Hypercholesterolemia  Lipitor 20 mg, well-controlled last November.  Follow-up in the fall with fasting labs at that time.    Mild COPD without flare.    Hypertension well controlled on atenolol 25 mg a day.    Previous spine surgery, no exacerbation.  Return to regular walking and treadmill.    History of colon polyp, not planning follow-up colonoscopy at this time, but can reevaluate in 2019 as far as his condition at that time.    Return in about 5 months (around 9/25/2018) for Recheck.   Patient Instructions   No change in treatment plan today.    Proceed with cardiac rehab.    No medication changes today.    Follow-up with cardiology as planned.    Routine follow-up with me in the fall, in September before you go on your cruise.  Come fasting at that time.      Alejandro Redd MD      Current Outpatient Prescriptions   Medication Sig " Dispense Refill     ascorbic acid, vitamin C, (ASCORBIC ACID WITH HERMILA HIPS) 500 MG tablet Take 500 mg by mouth daily.       aspirin 81 MG EC tablet Take 81 mg by mouth.       atenolol (TENORMIN) 25 MG tablet Take 1 tablet (25 mg total) by mouth daily. 90 tablet 0     cholecalciferol, vitamin D3, (VITAMIN D3) 1,000 unit capsule Take 2 capsules daily       fish oil-dha-epa 1,200-144-216 mg cap Take 1 capsule by mouth daily.       fluticasone (FLONASE) 50 mcg/actuation nasal spray Use 1 spray nasally two  times daily as needed for  hay fever 48 g 11     FOLIC ACID/MULTIVITS-MIN/LUT (CENTRUM SILVER ORAL) Take 1 tablet by mouth daily.       LIPITOR 20 mg tablet Take 1 tablet (20 mg total) by mouth daily. 90 tablet 0     naproxen sodium (ALEVE) 220 MG tablet Take 220 mg by mouth 2 (two) times a day with meals.       nitroglycerin (NITROSTAT) 0.4 MG SL tablet Place 0.4 mg under the tongue.       prasugrel (EFFIENT) 10 mg Tab tablet Take 10 mg by mouth.       tamsulosin (FLOMAX) 0.4 mg Cp24 Take 1 capsule (0.4 mg total) by mouth at bedtime. 90 capsule 0     zinc 50 mg Tab Take 1 tablet by mouth daily.       No current facility-administered medications for this visit.      Allergies   Allergen Reactions     Nuts - Unspecified Itching     Other reaction(s): Tongue Swelling  Brazil nuts     Social History   Substance Use Topics     Smoking status: Former Smoker     Smokeless tobacco: Never Used     Alcohol use None

## 2021-06-18 NOTE — PROGRESS NOTES
ITP ASSESSMENT   Assessment Day: 30 Day  Session Number: 8  Precautions: Falls  Diagnosis: Stent  Risk Stratification: Medium  Referring Provider: Alejandro Redd MD  EXERCISE  Exercise Assessment: Reassessment   Ron continues to participate in outpatient cardiac rehab 2x/week, he is progressing nicely and will discharge at 18 sessions.                            Exercise Plan  Goals Next 30 days  ADL'S: Continue to tolerate yard/garden work with assistance as needed.  Work: Start using home airbike for exercise     Education Goals: All goals in this section met  Education Goals Met: Patient can state cardiac s/s and appropriate emergency response.;Has system for taking medication.;Medication review.                        Goals Met  Initial ADL's goals met: Met. Garden has been planted and he is exercising daily between cardiac rehab and home TM.  Initial Leisure goals met: Met. 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   Initial Progression: Pt is doing well in rehab and has decided that he will complete 18 sessions.    Exercise Prescription  Exercise Mode: Treadmill;Bike;Nustep;Arm Erg.;Hallway Walking  Frequency: 2x/week  Duration: 40 minutes  Intensity / THR: 20-30 beats above resting heart rate  RPE 11-14  Progression / Met level: 2.5  Resistive Training?: Yes (will begin in the future, if appropriate)    Current Exercise (mins/week): 200    Interventions  Home Exercise:  Mode: Treadmill  Frequency: 4 days/week  Duration: 30 minutes    Education Material : Educational videos;Provide written material;Offer educational classes;Individual education and counseling    Education Completed  Exercise Education Completed: Cardiac Anatomy;Signs and Symptoms;Medication review;RPE;Emergency Plan;Home Exercise;Warm up/cool down;FITT Principles;BP/HR Reponse to exercise;Benefits of Exercise;End point of exercise            Exercise Follow-up/Discharge  Follow up/Discharge: Pt is exercising consistantly at home.          "NUTRITION  Nutrition Assessment: Reassessment    Nutrition Risk Factors:  Nutrition Risk Factors: Dyslipidemia;Overweight  Cholesterol: 115  LDL: 52  HDL: 46  Triglycerides: 83    Nutrition Plan  Interventions  Diet Consult: Completed  Other Nutrition Intervention: Therapist/Pt Discussion  Initial Rate Your Plate Score: 49      Education Completed  Nutrition Education Completed: Low Saturated fat diet;Low sodium diet;Weight management    Goals  Nutrition Goals (Next 30 days): Patient will follow a low sodium diet;Patient will follow a low saturated fat diet;Patient will lose weight    Goals Met  Nutrition Goals Met: Patient follows a low sodium diet;Patient states following a low saturated fat diet;Patient has lost weight    Height, Weight, and  BMI  Weight: 233 lb (105.7 kg)  Height: 5' 10.75\" (1.797 m)  BMI: 32.73    Nutrition Follow-up  Follow-up/Discharge: Patient stated he lost about 8 pounds since his stent.  His goal weight is around 220 pounds or less. He has been working on decreasing portion sizes and eating fruit betwen meals.      Other Risk Factors  Other Risk Factor Assessment: Reassessment    HTN Risk Factor: Hypertension    Pre Exercise BP: 132/64  Post Exercise BP: 124/60    Hypertension Plan  Goals  HTN Goals: Patient demonstrates understanding of HTN, no goals identified for the next 30 days    Goals Met  HTN Goals Met: Follow low sodium diet;Take medication as prescribed;Exercises regularly    HTN Interventions  HTN Interventions: Diet consult;Therapist/patient discussion;Offer educational videos;Provide written material;Offer educational classes    HTN Education Completed  HTN Education Completed: Low sodium diet;Medication review;Risk factor overview;Low sodium class    Tobacco Risk Factor: NA      Risk Factor Follow-up   Follow-up/Discharge: down 4 lbs, lipids are slightly improved on recent panel 5/15/2018       PSYCHOSOCIAL  Psychosocial Assessment: Reassessment     [facility] " Summary Score: 14    OSMAR-D Score: 0    Psychosocial Risk Factor: NA    Psychosocial Plan  Interventions  Interventions: Offer Spiritual Care consult;Offer educational videos and classes;Provide written material;Individual education and counseling    Education Completed  Education Completed: Effects of stress on body    Goals  Goals (Next 30 days): Identified Support system;Identify stressors;Patient demonstrates understanding of stress, no goals identified for the next 30 days    Goals Met  Goals Met: Identified Support system;Identify stressors;Practicing stress management skills    Psychosocial Follow-up  Follow-up/Discharge: pt reports that stress is not an issue for him at this times.         Patient involved in Goal setting?: Yes    Signature: _____________________________________________________________    Date: __________________    Time: __________________See Doc Flowsheet

## 2021-06-18 NOTE — PROGRESS NOTES
[facility] Follow Up Note    Scott Cherry   84 y.o. male    Date of Visit: 5/15/2018    Chief Complaint   Patient presents with     Follow-up     6 MO FOLLOW UP, fasting     Subjective  Scott is here for follow-up of multiple medical problems.    He did have a non-Q MI earlier this spring with some upper chest discomfort.  He felt he was having bronchitis, but did have a peak troponin I of 1.7.  He had 4 stents placed.    On April 13 he had an angiogram with stent intervention.  He had minimal left main disease, 60% LAD stenosis proximal to the previous stent and a 95% LAD stenosis distal to the stent.  He had 2 drug-eluting stents overlapping with the old stent, one proximal and one distal.  He had another drug-eluting stent placed in the PDA and one in the circumflex.  He also had PTCA of the diagonal, and proximal OM, blood flow was good after procedure and no complications.    Previous bare-metal stent to the LAD in 2001.  No previous MI before this.    Mild COPD has not been giving him a problem.  Distant smoker.  Negative chest x-ray May 2016.    Hypertension controlled on atenolol 25 mg a day, no lightheaded dizzy spells.  Trace edema stable.    He has had some mild bruising on his forearms with the Effient and aspirin.  No falls or other bleeding.  No blood in stool.    He starts his cardiac rehab tomorrow.  Sees cardiology this Friday.    Still on Lipitor.  No new muscle aches or right upper quadrant pain.    Cardiac echo earlier this month did show ejection fraction 55-60% with mild aortic insufficiency.  No wall motion abnormality.  Grade 1 diastolic dysfunction.    No flare of his lower back pain with decompression of the spinal stenosis March 2017 at L2-L5.    Prostate cancer history of 3+3 diagnosed 2014.  No significant urinary problems.  PSA is slowly rising to 9.4 last month.  He did see oncology and radiation oncology and it was not felt to be appropriate for  "radiation at this time.  He is on Flomax.    Past history of skin cancer, no new lesions noted and does have a follow-up in July.    No palpitations.  No current cough.  No abdominal complaints.    PMHx:    Past Medical History:   Diagnosis Date     Adenomatous colon polyp     in 2014, 5 year follow up     BPH (benign prostatic hyperplasia)     on flomax     CAD (coronary artery disease)     BMS LAD '01/ neg stress test '07/ no h/o MI     COPD (chronic obstructive pulmonary disease)     mild/ former smoker     History of basal cell carcinoma of skin     yearly exam in Sept.     HTN (hypertension)      Hypercholesteremia     controlled on lipitor     Prostate cancer     heriberto 3+3 on 4/14 bx.  No Tx yet, watchful waiting.      Spinal stenosis     severe L3-4, mod L4-5     PSHx:  No past surgical history on file.  Immunizations:   Immunization History   Administered Date(s) Administered     Influenza, inj, historic,unspecified 09/30/2009, 10/07/2010     Pneumo Conj 13-V (2010&after) 04/27/2015     Pneumo Polysac 23-V 01/01/2004, 06/23/2010     Td,adult,historic,unspecified 03/05/2008     Tdap 07/25/2012     ZOSTER, LIVE 01/21/2009       ROS A comprehensive review of systems was performed and was otherwise negative    Medications, allergies, and problem list were reviewed and updated    Exam  /82  Pulse (!) 57  Ht 5' 9\" (1.753 m)  Wt (!) 236 lb (107 kg)  SpO2 98%  BMI 34.85 kg/m2  Alert and oriented ×3 with normal mood and affect.  No jaundice.  No JVD.  Lungs are clear to auscultation.  No wheezing or crackles.  Heart is regular without murmur.  No extra beats.  Trace ankle edema bilaterally which is baseline.  Abdomen is nontender no hepatomegaly.    Assessment/Plan: Follow-up of multiple stable problems.  1. Coronary atherosclerosis due to calcified coronary lesion  Asymptomatic.  Active with regular walking and yard work.  Going to start cardiac rehab and see cardiology this week.    Full " code.    Continue medical management with Effient and aspirin.  Some bruising related to that, did discuss bleeding risk with these medications.  I discussed the importance of staying on the Effient with his stents.    Continue Lipitor 20 mg with LDL level T7 last November.  Checking lipids today.    2. Essential Hypercholesterolemia  Has been well-controlled on Lipitor.  - Lipid Cascade    3. Prostate cancer  Watchful waiting, minimal urinary symptoms on Flomax.    4. Medication monitoring encounter    - Comprehensive Metabolic Panel    Hypertension controlled with atenolol.    Mild COPD without flare.    History of skin cancer, dermatology follow-up in July.    He has a cruise in October, he can see me in 6 months after the cruise if stable.    History of spinal stenosis, no exacerbation after spine surgery.  Continue regular walking and treadmill    Return in about 6 months (around 11/15/2018) for Recheck.   Patient Instructions   No medication changes.    Routine lab work today.    Routine follow-up in 6 months.    Alejandro Redd MD      Current Outpatient Prescriptions   Medication Sig Dispense Refill     ascorbic acid, vitamin C, (ASCORBIC ACID WITH HERMILA HIPS) 500 MG tablet Take 500 mg by mouth daily.       aspirin 81 MG EC tablet Take 81 mg by mouth.       atenolol (TENORMIN) 25 MG tablet Take 1 tablet (25 mg total) by mouth daily. 90 tablet 0     cholecalciferol, vitamin D3, (VITAMIN D3) 1,000 unit capsule Take 2 capsules daily       fish oil-dha-epa 1,200-144-216 mg cap Take 1 capsule by mouth daily.       fluticasone (FLONASE) 50 mcg/actuation nasal spray Use 1 spray nasally two  times daily as needed for  hay fever 48 g 11     FOLIC ACID/MULTIVITS-MIN/LUT (CENTRUM SILVER ORAL) Take 1 tablet by mouth daily.       LIPITOR 20 mg tablet Take 1 tablet (20 mg total) by mouth daily. 90 tablet 3     naproxen sodium (ALEVE) 220 MG tablet Take 220 mg by mouth 2 (two) times a day with meals.       nitroglycerin  (NITROSTAT) 0.4 MG SL tablet Place 0.4 mg under the tongue.       prasugrel (EFFIENT) 10 mg Tab tablet Take 10 mg by mouth.       tamsulosin (FLOMAX) 0.4 mg Cp24 Take 1 capsule (0.4 mg total) by mouth at bedtime. 90 capsule 0     zinc 50 mg Tab Take 1 tablet by mouth daily.       No current facility-administered medications for this visit.      Allergies   Allergen Reactions     Nuts - Unspecified Itching     Other reaction(s): Tongue Swelling  Brazil nuts     Social History   Substance Use Topics     Smoking status: Former Smoker     Smokeless tobacco: Never Used     Alcohol use None

## 2021-06-18 NOTE — PATIENT INSTRUCTIONS - HE
Patient Instructions by Alejandro Redd MD at 9/17/2020  8:20 AM     Author: Alejandro Redd MD Service: -- Author Type: Physician    Filed: 9/17/2020  8:42 AM Encounter Date: 9/17/2020 Status: Addendum    : Alejandro Redd MD (Physician)    Related Notes: Original Note by Alejandro Redd MD (Physician) filed at 9/17/2020  8:42 AM       Walk for 20 minutes every day.  Daily walking will help you strengthen your pelvic muscles after your recent pelvic radiation.    See dermatology in December as planned.    See radiation oncology for prostate follow-up in December, as planned.    Get flu shot this fall.    Routine nonfasting checkup with me in 6 months.  Patient Education   Understanding Advance Care Planning  Advance care planning is the process of deciding ones own future medical care. It helps ensure that if you cant speak for yourself, your wishes can still be carried out. The plan is a series of legal documents that note a persons wishes. The documents vary by state. Advance care planning may be done when a person has a serious illness that is expected to get worse. It may be done before major surgery. And it can help you and your family be prepared in case of a major illness or injury. Advance care planning helps with making decisions at these times.       A health care proxy is a person who acts as the voice of a patient when the patient cant speak for himself or herself. The name of this role varies by state. It may be called a Durable Medical Power of  or Durable Power of  for Healthcare. It may be called an agent, surrogate, or advocate. Or it may be called a representative or decision maker. It is an official duty that is identified by a legal document. The document also varies by state.    Why Is Advance Care Planning Important?  If a person communicates their healthcare wishes:    They will be given medical care that matches their values and goals.    Their family members will not  be forced to make decisions in a crisis with no guidance.  Creating a Plan  Making an advance care plan is often done in 3 steps:    Thinking about ones wishes. To create an advance care plan, you should think about what kind of medical treatment you would want if you lose the ability to communicate. Are there any situations in which you would refuse or stop treatment? Are there therapies you would want or not want? And whom do you want to make decisions for you? There are many places to learn more about how to plan for your care. Ask your doctor or  for resources.    Picking a health care proxy. This means choosing a trusted person to speak for you only when you cant speak for yourself. When you cannot make medical decisions, your proxy makes sure the instructions in your advance care plan are followed. A proxy does not make decisions based on his or her own opinions. They must put aside those opinions and values if needed, and carry out your wishes.    Filling out the legal documents. There are several kinds of legal documents for advance care planning. Each one tells health care providers your wishes. The documents may vary by state. They must be signed and may need to be witnessed or notarized. You can cancel or change them whenever you wish. Depending on your state, the documents may include a Healthcare Proxy form, Living Will, Durable Medical Power of , Advance Directive, or others.  The Familys Role  The best help a family can give is to support their loved ones wishes. Open and honest communication is vital. Family should express any concerns they have about the patients choices while the patient can still make decisions.    [id number] [employer] [address] [place] [address], [place] [address]. All rights reserved. This information is not intended as a substitute for professional medical care. Always follow your healthcare professional's instructions.         Also, Honoring  [facility] offers a free, downloadable health care directive that allows you to share your treatment choices and personal preferences if you cannot communicate your wishes. It also allows you to appoint another person (called a health care agent) to make health care decisions if you are unable to do so. You can download an advance directive by going here: [email or web address]     Patient Education   Personalized Prevention Plan  You are due for the preventive services outlined below.  Your care team is available to assist you in scheduling these services.  If you have already completed any of these items, please share that information with your care team to update in your medical record.  Health Maintenance   Topic Date Due   ? ZOSTER VACCINES (2 of 3) 03/18/2009   ? MEDICARE ANNUAL WELLNESS VISIT  05/20/2020   ? FALL RISK ASSESSMENT  05/20/2020   ? INFLUENZA VACCINE RULE BASED (1) 08/01/2020   ? TD 18+ HE  07/25/2022   ? ADVANCE CARE PLANNING  05/20/2024   ? PNEUMOCOCCAL IMMUNIZATION 65+ LOW/MEDIUM RISK  Completed   ? HEPATITIS B VACCINES  Aged Out

## 2021-06-18 NOTE — PROGRESS NOTES
ITP ASSESSMENT   Assessment Day: Initial  Session Number: 1  Precautions: Falls  Diagnosis: Stent  Risk Stratification: Medium  Referring Provider: Alejandro Redd MD  EXERCISE  Exercise Assessment: Initial   Pt was able to exercise at 2.3 METS for 15 minutes. Will continue to increase as tolerated.                       Exercise Plan  Goals Next 30 days  ADL'S: Plant garden, Start home exercise 2 days/week  Leisure: Open screen porch   Work: Retired    Education Goals: Medication review  Education Goals Met: Has system for taking medication.    Exercise Prescription  Exercise Mode: Treadmill;Bike;Nustep;Arm Erg.;Hallway Walking  Frequency: 2x/week  Duration: 30-40 minutes  Intensity / THR: 20-30 beats above resting heart rate  RPE 11-14  Progression / Met level: 2.5-2.8  Resistive Training?: Yes    Current Exercise (mins/week): 20    Interventions  Home Exercise:  Mode: Walking/Treadmill  Frequency: 2-3 days/week  Duration: 20-40 minutes    Education Material : Educational videos;Provide written material;Offer educational classes;Individual education and counseling    Education Completed  Exercise Education Completed: Cardiac Anatomy;Signs and Symptoms;Medication review;RPE;Emergency Plan;Home Exercise;Warm up/cool down;FITT Principles;BP/HR Reponse to exercise;Benefits of Exercise;End point of exercise            Exercise Follow-up/Discharge  Follow up/Discharge: Encouraged pt to start home walking program NUTRITION  Nutrition Assessment: Initial    Nutrition Risk Factors:  Nutrition Risk Factors: Dyslipidemia;Overweight  Cholesterol: 126  LDL: 57  HDL: 51  Triglycerides: 91    Nutrition Plan  Interventions  Other Nutrition Intervention: Diet Class;Therapist/Pt Discussion;Educational Videos;Provide with Written Material    Education Completed  Nutrition Education Completed: Risk factor overview    Goals  Nutrition Goals (Next 30 days): Patient can identify their risk factors for CAD;Patient will follow a low  "sodium diet;Patient will follow a low saturated fat diet;Patient knows appropriate portion size    Goals Met  Nutrition Goals Met: Patient can identify their risk factors for CAD;Reviewed Dietitian schedule;Provided Rate your Plate Survey    Height, Weight, and  BMI  Weight: 237 lb (107.5 kg)  Height: 5' 10.75\" (1.797 m)  BMI: 33.29    Nutrition Follow-up  Follow-up/Discharge: Encouraged pt to follow heart healthy diet       Other Risk Factors  Other Risk Factor Assessment: Initial    HTN Risk Factor: Hypertension    Pre Exercise BP: 114/70  Post Exercise BP: 104/62    Hypertension Plan  Goals  HTN Goals: Follow low sodium diet;Take medication as prescribed;Exercises regularly    Goals Met  HTN Goals Met: Take medication as prescribed    HTN Interventions  HTN Interventions: Diet consult;Therapist/patient discussion;Offer educational videos;Provide written material;Offer educational classes    HTN Education Completed  HTN Education Completed: Risk factor overview    Tobacco Risk Factor: NA    Risk Factor Follow-up   Follow-up/Discharge: Discussed cardiac risk factors   PSYCHOSOCIAL  Psychosocial Assessment: Initial     [facility] Q of L Summary Score: 14    OSMAR-D Score: 0    Psychosocial Risk Factor: NA    Psychosocial Plan  Interventions  If OSMAR-D > 15 send letter to MD  Interventions: Offer Spiritual Care consult;Offer educational videos and classes;Provide written material;Individual education and counseling    Education Completed  Education Completed: Effects of stress on body    Goals  Goals (Next 30 days): Identified Support system;Identify stressors;Patient demonstrates understanding of stress, no goals identified for the next 30 days    Goals Met  Goals Met: Identified Support system;Identify stressors;Practicing stress management skills    Psychosocial Follow-up  Follow-up/Discharge: Pt stated Minimal stress at home           Patient involved in Goal setting?: Yes    Signature: " _____________________________________________________________    Date: __________________    Time: __________________

## 2021-06-19 NOTE — PROGRESS NOTES
ITP ASSESSMENT   Assessment Day: 60 Day  Session Number: 14  Precautions: Falls/ Standard  Diagnosis: Stent  Risk Stratification: Medium  Referring Provider: Alejandro Redd MD  EXERCISE  Exercise Assessment: Reassessment   Pt is tolerating 40-45 min of exercise in Cardiac Rehab without sx's. Met levels are 2.5-2.8                       Exercise Plan  Goals Next 30 days  ADL'S: Resume home repair projects-- Without sx's or fatigue  Leisure: Start exercising on the TM here at [facility], and then continue using the TM for home exercise.    Education Goals: All goals in this section met  Education Goals Met: Patient can state cardiac s/s and appropriate emergency response.;Has system for taking medication.;Medication review.                        Goals Met  30 day ADL'S goals met: Goal met-tolerating yardwork/ gardening without sx's  No Data Recorded  30 day Work goals met: Goal met- Pt reports using airbike for home exercise and tolerating well  30 Day Progression: Pt is progressing- new goals set. Pt is limited by back pain    Initial ADL's goals met: Met. Garden has been planted and he is exercising daily between cardiac rehab and home TM.  Initial Leisure goals met: Met. Pt has opened the [employer] for summer enjoyment.   Initial Progression: Pt is doing well in rehab and has decided that he will complete 18 sessions.    Exercise Prescription  Exercise Mode: Treadmill;Bike;Nustep;Arm Erg.;Hallway Walking  Frequency: 2 x a week  Duration: 40 min  Intensity / THR: 20-30 beats above resting heart rate  RPE 11-14  Progression / Met level: 2.6-2.9  Resistive Training?: Yes    Current Exercise (mins/week): 160    Interventions  Home Exercise:  Mode: TM/ Bike  Frequency: 3-4 x a week  Duration: 30-40 min    Education Material : Educational videos;Provide written material;Offer educational classes;Individual education and counseling    Education Completed  Exercise Education Completed: Cardiac Anatomy;Signs and Symptoms;Medication  "review;RPE;Emergency Plan;Home Exercise;Warm up/cool down;FITT Principles;BP/HR Reponse to exercise;Benefits of Exercise;End point of exercise            Exercise Follow-up/Discharge  Follow up/Discharge: Reviewed home exercise program NUTRITION  Nutrition Assessment: Reassessment    Nutrition Risk Factors:  Nutrition Risk Factors: Dyslipidemia;Overweight  Cholesterol: 115  LDL: 52  HDL: 46  Triglycerides: 83    Nutrition Plan  Interventions  Diet Consult: Completed  Other Nutrition Intervention: Diet Class;Therapist/Pt Discussion;Educational Videos;Provide with Written Material  Initial Rate Your Plate Score: 49    Education Completed  Nutrition Education Completed: Low Saturated fat diet;Low sodium diet;Weight management    Goals  Nutrition Goals (Next 30 days): Patient will follow a low sodium diet;Patient will follow a low saturated fat diet;Patient will lose weight    Goals Met  Nutrition Goals Met: Patient follows a low sodium diet;Patient states following a low saturated fat diet;Patient has lost weight    Height, Weight, and  BMI  Weight: 230 lb (104.3 kg)  Height: 5' 10.75\" (1.797 m)  BMI: 32.31    Nutrition Follow-up  Follow-up/Discharge: Patient stated he lost about 8 pounds since his stent.  His goal weight is around 220 pounds or less. He has been working on decreasing portion sizes and eating fruit betwen meals.        Other Risk Factors  Other Risk Factor Assessment: Reassessment    HTN Risk Factor: Hypertension    Pre Exercise BP: 130/62  Post Exercise BP: 124/64    Hypertension Plan  Goals  HTN Goals: Patient demonstrates understanding of HTN, no goals identified for the next 30 days    Goals Met  HTN Goals Met: Follow low sodium diet;Take medication as prescribed;Exercises regularly    HTN Interventions  HTN Interventions: Diet consult;Therapist/patient discussion;Offer educational videos;Provide written material;Offer educational classes    HTN Education Completed  HTN Education Completed: Low " sodium diet;Medication review;Risk factor overview;Low sodium class    Tobacco Risk Factor: NA    Risk Factor Follow-up   Follow-up/Discharge: Offer education for risk factor management   PSYCHOSOCIAL  Psychosocial Assessment: Reassessment       Psychosocial Risk Factor: NA    Psychosocial Plan  Interventions  Interventions: Offer Spiritual Care consult;Offer educational videos and classes;Provide written material;Individual education and counseling    Education Completed  Education Completed: Effects of stress on body;S/S of depression;Relaxation/Coping Techniques    Goals  Goals (Next 30 days): Identified Support system;Identify stressors;Patient demonstrates understanding of stress, no goals identified for the next 30 days    Goals Met  Goals Met: Identify stressors;Practicing stress management skills;Identified Support system;Oriented to stress management classes    Psychosocial Follow-up  Follow-up/Discharge: Pt reports he is managing his stress without difficulty     Patient involved in Goal setting?: Yes    Signature: _____________________________________________________________    Date: __________________    Time: __________________See Doc Flowsheet

## 2021-06-19 NOTE — PROGRESS NOTES
ITP ASSESSMENT   Assessment Day: 90 Day/Discharge  Session Number: 21  Precautions: Falls/ Standard  Diagnosis: Stent  Risk Stratification: Medium  Referring Provider: Alejandro Redd MD  EXERCISE  Exercise Assessment: Discharge   Patient has completed 21 sessions of Cardiac Rehab. He exercised for 40 minutes using the NuStep and Arm Ergometer at MET level 2.6-3.1. He denies cardiac symptoms.                                         Exercise Plan  Goals Next 30 days  ADL'S: Resume home repair projects-- Without sx's or fatigue  Leisure: Start exercising on the TM here at [facility], and then continue using the TM for home exercise.    Education Goals: All goals in this section met  Education Goals Met: Patient can state cardiac s/s and appropriate emergency response.;Has system for taking medication.;Medication review.         30 day ADL'S goals met: Goal met-tolerating yardwork/ gardening without sx's  No Data Recorded  30 day Work goals met: Goal met- Pt reports using airbike for home exercise and tolerating well  30 Day Progression: Pt is progressing- new goals set. Pt is limited by back pain    Initial ADL's goals met: Met. Garden has been planted and he is exercising daily between cardiac rehab and home TM.  Initial Leisure goals met: Met. Pt has opened the [employer] for summer enjoyment.   Initial Progression: Pt is doing well in rehab and has decided that he will complete 18 sessions.    Exercise Prescription  Exercise Mode: Treadmill;Bike;Nustep;Arm Erg.;Hallway Walking  Frequency: 2 x a week  Duration: 40 min  Intensity / THR: 20-30 beats above resting heart rate  RPE 11-14  Progression / Met level: 2.6-2.9  Resistive Training?: Yes    Current Exercise (mins/week): 0    Interventions  Home Exercise:  Mode: TM/Bike  Frequency: 3-5x/week  Duration: 30-40    Education Material : Educational videos;Provide written material;Offer educational classes;Individual education and counseling    Education Completed  Exercise  "Education Completed: Strength training            Exercise Follow-up/Discharge  Follow up/Discharge: Reviewed home exercise program at MET level 2.5-3.1 NUTRITION  Nutrition Assessment: Discharge    Nutrition Risk Factors:  Nutrition Risk Factors: Dyslipidemia;Overweight  Cholesterol: 115  LDL: 52  HDL: 46  Triglycerides: 83    Nutrition Plan  Interventions  Diet Consult: Completed  Other Nutrition Intervention: Diet Class;Therapist/Pt Discussion;Educational Videos;Provide with Written Material  Initial Rate Your Plate Score: 49    Education Completed  Nutrition Education Completed: Low Saturated fat diet;Low sodium diet;Weight management    Goals  Nutrition Goals (Next 30 days): Patient will follow a low sodium diet;Patient will follow a low saturated fat diet;Patient will lose weight    Goals Met  Nutrition Goals Met: Patient follows a low sodium diet;Patient states following a low saturated fat diet;Patient has lost weight    Height, Weight, and  BMI  Weight: 225 lb (102.1 kg)  Height: 5' 10.75\" (1.797 m)  BMI: 31.6    Nutrition Follow-up  Follow-up/Discharge: Will continue to follow heart heallthy diet       Other Risk Factors  Other Risk Factor Assessment: Discharge    HTN Risk Factor: Hypertension    Pre Exercise BP: 122/56  Post Exercise BP: 106/66    Hypertension Plan  Goals  HTN Goals: Patient demonstrates understanding of HTN, no goals identified for the next 30 days    Goals Met  HTN Goals Met: Follow low sodium diet;Take medication as prescribed;Exercises regularly    HTN Interventions  HTN Interventions: Diet consult;Therapist/patient discussion;Offer educational videos;Provide written material;Offer educational classes    HTN Education Completed  HTN Education Completed: Low sodium diet;Medication review;Risk factor overview;Low sodium class    Tobacco Risk Factor: NA    Risk Factor Follow-up   Follow-up/Discharge: Pt. will continue to follow heart healthy diet   PSYCHOSOCIAL  Psychosocial Assessment: " Discharge     [facility] EREN Q of L Summary Score: 12    OSMAR-D Score: 0    Psychosocial Risk Factor: NA    Psychosocial Plan  Interventions  Interventions: Offer Spiritual Care consult;Offer educational videos and classes;Provide written material;Individual education and counseling    Education Completed  Education Completed: Effects of stress on body;S/S of depression;Relaxation/Coping Techniques    Goals  Goals (Next 30 days): Identified Support system;Identify stressors;Patient demonstrates understanding of stress, no goals identified for the next 30 days    Goals Met  Goals Met: Identify stressors;Practicing stress management skills;Identified Support system;Oriented to stress management classes    Psychosocial Follow-up  Follow-up/Discharge: He continues to manage stress without difficulty           Patient involved in Goal setting?: Yes    Signature: _____________________________________________________________    Date: __________________    Time: __________________See Doc Flowsheet

## 2021-06-19 NOTE — LETTER
Letter by Alejandro Redd MD at      Author: Alejandro Redd MD Service: -- Author Type: --    Filed:  Encounter Date: 5/21/2019 Status: (Other)         Scott Cherry  [address]  [place] [address]             May 21, 2019         Dear Mr. Cherry,    Below are the results from your recent visit:    Resulted Orders   Comprehensive Metabolic Panel   Result Value Ref Range    Sodium 139 136 - 145 mmol/L    Potassium 4.5 3.5 - 5.0 mmol/L    Chloride 106 98 - 107 mmol/L    CO2 25 22 - 31 mmol/L    Anion Gap, Calculation 8 5 - 18 mmol/L    Glucose 98 70 - 125 mg/dL    BUN 21 8 - 28 mg/dL    Creatinine 1.14 0.70 - 1.30 mg/dL    GFR MDRD Af Amer >60 >60 mL/min/1.73m2    GFR MDRD Non Af Amer >60 >60 mL/min/1.73m2    Bilirubin, Total 0.7 0.0 - 1.0 mg/dL    Calcium 9.6 8.5 - 10.5 mg/dL    Protein, Total 6.4 6.0 - 8.0 g/dL    Albumin 3.7 3.5 - 5.0 g/dL    Alkaline Phosphatase 77 45 - 120 U/L    AST 19 0 - 40 U/L    ALT 16 0 - 45 U/L    Narrative    Fasting Glucose reference range is 70-99 mg/dL per  American Diabetes Association (ADA) guidelines.   HM2(CBC w/o Differential)   Result Value Ref Range    WBC 4.7 4.0 - 11.0 thou/uL    RBC 4.22 (L) 4.40 - 6.20 mill/uL    Hemoglobin 12.2 (L) 14.0 - 18.0 g/dL    Hematocrit 37.1 (L) 40.0 - 54.0 %    MCV 88 80 - 100 fL    MCH 28.8 27.0 - 34.0 pg    MCHC 32.8 32.0 - 36.0 g/dL    RDW 13.8 11.0 - 14.5 %    Platelets 164 140 - 440 thou/uL    MPV 7.6 7.0 - 10.0 fL   Lipid Cascade   Result Value Ref Range    Cholesterol 115 <=199 mg/dL    Triglycerides 82 <=149 mg/dL    HDL Cholesterol 53 >=40 mg/dL    LDL Calculated 46 <=129 mg/dL    Patient Fasting > 8hrs? Yes        Kidney and liver tests are normal.  Blood sugar is normal.    Hemoglobin level is okay, similar to previous measurement.  Other blood counts are normal.    Excellent cholesterol levels.    No change in treatment plan.    Please call with questions or contact us using  "[email or web address]t.    Sincerely,        Electronically signed by Alejandro Redd MD

## 2021-06-19 NOTE — LETTER
Letter by Alejandro Redd MD at      Author: Alejandro Redd MD Service: -- Author Type: --    Filed:  Encounter Date: 11/20/2019 Status: Signed         Scott Cherry  [address]  [place] [address]             November 20, 2019         Dear Mr. Cherry,    Below are the results from your recent visit:    Resulted Orders   Comprehensive Metabolic Panel   Result Value Ref Range    Sodium 141 136 - 145 mmol/L    Potassium 4.8 3.5 - 5.0 mmol/L    Chloride 106 98 - 107 mmol/L    CO2 26 22 - 31 mmol/L    Anion Gap, Calculation 9 5 - 18 mmol/L    Glucose 101 70 - 125 mg/dL    BUN 26 8 - 28 mg/dL    Creatinine 1.24 0.70 - 1.30 mg/dL    GFR MDRD Af Amer >60 >60 mL/min/1.73m2    GFR MDRD Non Af Amer 55 (L) >60 mL/min/1.73m2    Bilirubin, Total 0.8 0.0 - 1.0 mg/dL    Calcium 9.7 8.5 - 10.5 mg/dL    Protein, Total 6.7 6.0 - 8.0 g/dL    Albumin 3.8 3.5 - 5.0 g/dL    Alkaline Phosphatase 83 45 - 120 U/L    AST 23 0 - 40 U/L    ALT 19 0 - 45 U/L    Narrative    Fasting Glucose reference range is 70-99 mg/dL per  American Diabetes Association (ADA) guidelines.   Lipid Cascade   Result Value Ref Range    Cholesterol 122 <=199 mg/dL    Triglycerides 71 <=149 mg/dL    HDL Cholesterol 57 >=40 mg/dL    LDL Calculated 51 <=129 mg/dL    Patient Fasting > 8hrs? Yes        Kidney and liver tests are normal.  Blood sugar is normal.    Excellent cholesterol levels.    No change in treatment plan.    Please call with questions or contact us using [employer].    Sincerely,        Electronically signed by Alejandro Redd MD

## 2021-06-19 NOTE — PROGRESS NOTES
Audiology only    Patient's requested replacement silver zinc rechargeable hearing aid batteries (size 312) are available for pickup at [facility] . These will need to be placed in his hearing aids and charged in the hearing aid  overnight prior to use. Patient's cost is $50.00 for the pair; this will be billed to his account. Patient did not see provider today.    Mary Madrid, [facility]  [place] Licensed Audiologist [id number]

## 2021-06-21 NOTE — LETTER
Letter by Alejandro Redd MD at      Author: Alejandro Redd MD Service: -- Author Type: --    Filed:  Encounter Date: 3/29/2021 Status: (Other)         Scott Cherry  [address]  [place] [address]             March 29, 2021         Dear Mr. Cherry,    Below are the results from your recent visit:    Resulted Orders   Comprehensive Metabolic Panel   Result Value Ref Range    Sodium 139 136 - 145 mmol/L    Potassium 4.8 3.5 - 5.0 mmol/L    Chloride 106 98 - 107 mmol/L    CO2 25 22 - 31 mmol/L    Anion Gap, Calculation 8 5 - 18 mmol/L    Glucose 106 70 - 125 mg/dL    BUN 23 8 - 28 mg/dL    Creatinine 1.07 0.70 - 1.30 mg/dL    GFR MDRD Af Amer >60 >60 mL/min/1.73m2    GFR MDRD Non Af Amer >60 >60 mL/min/1.73m2    Bilirubin, Total 0.8 0.0 - 1.0 mg/dL    Calcium 9.2 8.5 - 10.5 mg/dL    Protein, Total 6.6 6.0 - 8.0 g/dL    Albumin 3.9 3.5 - 5.0 g/dL    Alkaline Phosphatase 91 45 - 120 U/L    AST 21 0 - 40 U/L    ALT 12 0 - 45 U/L    Narrative    Fasting Glucose reference range is 70-99 mg/dL per  American Diabetes Association (ADA) guidelines.       Kidney and liver tests are normal.  Potassium level is normal.  Blood sugar is normal.    No change in treatment plan, as discussed at recent clinic visit.    Please call with questions or contact us using [employer].    Sincerely,        Electronically signed by Alejandro Redd MD

## 2021-06-21 NOTE — PROGRESS NOTES
[facility] Follow Up Note    Scott Cherry   84 y.o. male    Date of Visit: 11/15/2018    Chief Complaint   Patient presents with     6 month follow-up     Subjective  Scott is here for a checkup for his coronary artery disease.  He enjoyed the cruise in October.  He walks quite a bit and tolerated that.  He does do the treadmill on a regular basis.  No chest pain or chest pressure.  No increasing shortness of breath.    Mild COPD.    The cardiac echo May 2018 with ejection fraction 55-60% with mild aortic insufficiency.  Grade 1 diastolic dysfunction no wall motion abnormality.    Bare-metal stent 16 years ago of the LAD.    April 2018 after non-Q wave MI with a peak troponin I of 1.7.    With April 2018: 2 drug-eluting stents LAD, one in the PDA and one in the circumflex.  Also PTCA of the diagonal and proximal OM.    No recurrent chest pain since then.  Some minor bruising but no other bleeding issues on Effient and aspirin.  Tolerating Lipitor 20 mg a day.  No generalized myalgias or right upper quadrant pain.    Hypertension well-controlled on atenolol, no lightheaded dizzy spells or edema.    Past history of prostate cancer 3+3 diagnosed in 2014.  Still on watchful waiting with a PSA of 9.4 in April.  He is expecting to hear from his oncologist soon for his follow-up this fall.    Spinal stenosis without major exacerbation.  Decompression surgery of L2-L5 in March 2017.  No falls.  No radicular pain.    Past history of skin cancer, saw dermatology this past summer with a number of lesions, just saw them last month and will see them again next month.        PMHx:    Past Medical History:   Diagnosis Date     Adenomatous colon polyp     in 2014, 5 year follow up     BPH (benign prostatic hyperplasia)     on flomax     CAD (coronary artery disease)     BMS LAD '01/ neg stress test '07/ no h/o MI     COPD (chronic obstructive pulmonary disease) (H)     mild/ former smoker     History of  basal cell carcinoma of skin     yearly exam in Sept.     HTN (hypertension)      Hypercholesteremia     controlled on lipitor     Prostate cancer (H)     heriberto 3+3 on 4/14 bx.  No Tx yet, watchful waiting.      Spinal stenosis     severe L3-4, mod L4-5     PSHx:  No past surgical history on file.  Immunizations:   Immunization History   Administered Date(s) Administered     Influenza high dose, seasonal 10/24/2018     Influenza, inj, historic,unspecified 09/30/2009, 10/07/2010     Pneumo Conj 13-V (2010&after) 04/27/2015     Pneumo Polysac 23-V 01/01/2004, 06/23/2010     Td,adult,historic,unspecified 03/05/2008     Tdap 07/25/2012     ZOSTER, LIVE 01/21/2009       ROS A comprehensive review of systems was performed and was otherwise negative    Medications, allergies, and problem list were reviewed and updated    Exam  /66 (Patient Site: Right Arm, Patient Position: Sitting, Cuff Size: Adult Large)   Pulse 60   Wt (!) 225 lb 14.4 oz (102.5 kg)   BMI 33.36 kg/m    Appears well.  Able to climb up on the exam table.  No JVD.  Lungs are clear.  Heart is regular without murmur.  Trace bilateral ankle edema    Assessment/Plan  1. Coronary atherosclerosis due to calcified coronary lesion  Asymptomatic with good exertional ability.  Continue medical management.  Sees cardiology for yearly follow-up in May.  He likely will be able to come off the Effient at that time.    Excellent cholesterol levels in May on current Lipitor.    Full code    2. Prostate cancer (H)  Watchful waiting.  He will follow-up with oncology later this fall.  Flomax  3. Essential hypertension  Controlled on atenolol    4. Spinal stenosis of lumbar region, unspecified whether neurogenic claudication present  Walks on treadmill on a regular basis.  No exacerbation.    Past history of skin cancer, follow-up with dermatology as planned.    Mild COPD, no exacerbation.  He has had the flu shot last month.    Return in about 6 months (around  5/15/2019) for Recheck.   Patient Instructions   No medication changes.    Follow-up in 6 months, after your cardiology appointment.    Alejandro Redd MD        Current Outpatient Medications   Medication Sig Dispense Refill     ascorbic acid, vitamin C, (ASCORBIC ACID WITH HERMILA HIPS) 500 MG tablet Take 500 mg by mouth daily.       aspirin 81 MG EC tablet Take 81 mg by mouth.       atenolol (TENORMIN) 25 MG tablet Take 1 tablet (25 mg total) by mouth daily. 90 tablet 3     cholecalciferol, vitamin D3, (VITAMIN D3) 1,000 unit capsule Take 2 capsules daily       fish oil-dha-epa 1,200-144-216 mg cap Take 1,000 mg by mouth daily .             fluticasone (FLONASE) 50 mcg/actuation nasal spray Use 1 spray nasally two  times daily as needed for  hay fever 48 g 11     FOLIC ACID/MULTIVITS-MIN/LUT (CENTRUM SILVER ORAL) Take 1 tablet by mouth daily.       LIPITOR 20 mg tablet Take 1 tablet (20 mg total) by mouth daily. 90 tablet 3     nitroglycerin (NITROSTAT) 0.4 MG SL tablet Place 0.4 mg under the tongue.       prasugrel (EFFIENT) 10 mg Tab tablet Take 10 mg by mouth.       tamsulosin (FLOMAX) 0.4 mg Cp24 TAKE 1 CAPSULE BY MOUTH AT  BEDTIME 90 capsule 3     zinc 50 mg Tab Take 1 tablet by mouth daily.       naproxen sodium (ALEVE) 220 MG tablet Take 220 mg by mouth as needed .             No current facility-administered medications for this visit.      Allergies   Allergen Reactions     Nuts - Unspecified Itching     Other reaction(s): Tongue Swelling  Brazil nuts     Social History     Tobacco Use     Smoking status: Former Smoker     Smokeless tobacco: Never Used   Substance Use Topics     Alcohol use: Not on file     Drug use: Not on file

## 2021-06-21 NOTE — LETTER
Letter by Alejandro Redd MD at      Author: Alejandro Redd MD Service: -- Author Type: --    Filed:  Encounter Date: 4/20/2021 Status: (Other)         Scott Cherry  [address]  [facility] [address]             April 20, 2021         Dear Mr. Cherry,    Below are the results from your recent visit:    Resulted Orders   Basic Metabolic Panel   Result Value Ref Range    Sodium 140 136 - 145 mmol/L    Potassium 4.8 3.5 - 5.0 mmol/L    Chloride 106 98 - 107 mmol/L    CO2 24 22 - 31 mmol/L    Anion Gap, Calculation 10 5 - 18 mmol/L    Glucose 104 70 - 125 mg/dL    Calcium 9.3 8.5 - 10.5 mg/dL    BUN 24 8 - 28 mg/dL    Creatinine 1.13 0.70 - 1.30 mg/dL    GFR MDRD Af Amer >60 >60 mL/min/1.73m2    GFR MDRD Non Af Amer >60 >60 mL/min/1.73m2    Narrative    Fasting Glucose reference range is 70-99 mg/dL per  American Diabetes Association (ADA) guidelines.       Kidney labs are normal.  Potassium level is normal.  No change in treatment plan.    Please call with questions or contact us using [employer].    Sincerely,        Electronically signed by Alejandro Redd MD

## 2021-06-24 NOTE — TELEPHONE ENCOUNTER
Refill Approved    Rx renewed per Medication Renewal Policy. Medication was last renewed on 5/22/18.    Nida Silverio, [facility] Triage/Med Refill 3/1/2019     Requested Prescriptions   Pending Prescriptions Disp Refills     atenolol (TENORMIN) 25 MG tablet [Pharmacy Med Name: ATENOLOL  25MG  TAB] 90 tablet 3     Sig: TAKE 1 TABLET BY MOUTH  DAILY    Beta-Blockers Refill Protocol Passed - 3/1/2019  9:14 AM       Passed - PCP or prescribing provider visit in past 12 months or next 3 months    Last office visit with prescriber/PCP: 11/15/2018 Alejandro Redd MD OR same dept: 11/15/2018 Alejandro Redd MD OR same specialty: 11/15/2018 Alejandro Redd MD  Last physical: 2/23/2017 Last MTM visit: Visit date not found   Next visit within 3 mo: Visit date not found  Next physical within 3 mo: Visit date not found  Prescriber OR PCP: Alejandro Redd MD  Last diagnosis associated with med order: 1. Hypertension  - atenolol (TENORMIN) 25 MG tablet [Pharmacy Med Name: ATENOLOL  25MG  TAB]; TAKE 1 TABLET BY MOUTH  DAILY  Dispense: 90 tablet; Refill: 3    If protocol passes may refill for 12 months if within 3 months of last provider visit (or a total of 15 months).            Passed - Blood pressure filed in past 12 months    BP Readings from Last 1 Encounters:   11/15/18 116/66             tamsulosin (FLOMAX) 0.4 mg cap [Pharmacy Med Name: TAMSULOSIN  0.4MG  CAP] 90 capsule      Sig: TAKE 1 CAPSULE BY MOUTH AT  BEDTIME    Alfuzosin/Tamsulosin/Silodosin Refill Protocol  Passed - 3/1/2019  9:14 AM       Passed - PCP or prescribing provider visit in past 12 months      Last office visit with prescriber/PCP: 11/15/2018 Alejandro Redd MD OR same dept: 11/15/2018 Alejandro Redd MD OR same specialty: 11/15/2018 Alejandro Redd MD  Last physical: 2/23/2017 Last MTM visit: Visit date not found   Next visit within 3 mo: Visit date not found  Next physical within 3 mo: Visit date not found  Prescriber OR PCP: Alejandro Redd  MD  Last diagnosis associated with med order: 1. Hypertension  - atenolol (TENORMIN) 25 MG tablet [Pharmacy Med Name: ATENOLOL  25MG  TAB]; TAKE 1 TABLET BY MOUTH  DAILY  Dispense: 90 tablet; Refill: 3    If protocol passes may refill for 12 months if within 3 months of last provider visit (or a total of 15 months).

## 2021-06-27 ENCOUNTER — HEALTH MAINTENANCE LETTER (OUTPATIENT)
Age: 86
End: 2021-06-27

## 2021-07-01 VITALS
WEIGHT: 230.4 LBS | HEIGHT: 69 IN | HEART RATE: 72 BPM | BODY MASS INDEX: 34.13 KG/M2 | TEMPERATURE: 96.7 F | DIASTOLIC BLOOD PRESSURE: 70 MMHG | SYSTOLIC BLOOD PRESSURE: 124 MMHG

## 2021-07-06 ENCOUNTER — RECORDS - HEALTHEAST (OUTPATIENT)
Dept: ADMINISTRATIVE | Facility: OTHER | Age: 86
End: 2021-07-06

## 2021-07-26 ENCOUNTER — TELEPHONE (OUTPATIENT)
Dept: INTERNAL MEDICINE | Facility: CLINIC | Age: 86
End: 2021-07-26

## 2021-08-03 NOTE — TELEPHONE ENCOUNTER
Hearing aids are back from  and will be left at the [facility]  for patient to  at his convenience. He expressed verbal understanding of this information and plan.    Mary Madrid, [facility]  [place] Licensed Audiologist [phone]

## 2021-09-17 ASSESSMENT — ACTIVITIES OF DAILY LIVING (ADL): CURRENT_FUNCTION: NO ASSISTANCE NEEDED

## 2021-09-20 ENCOUNTER — OFFICE VISIT (OUTPATIENT)
Dept: INTERNAL MEDICINE | Facility: CLINIC | Age: 86
End: 2021-09-20
Payer: MEDICARE

## 2021-09-20 VITALS
BODY MASS INDEX: 33.92 KG/M2 | HEART RATE: 84 BPM | WEIGHT: 229 LBS | SYSTOLIC BLOOD PRESSURE: 142 MMHG | DIASTOLIC BLOOD PRESSURE: 68 MMHG | HEIGHT: 69 IN

## 2021-09-20 DIAGNOSIS — I25.10 CORONARY ATHEROSCLEROSIS DUE TO CALCIFIED CORONARY LESION: ICD-10-CM

## 2021-09-20 DIAGNOSIS — Z85.46 HISTORY OF PROSTATE CANCER: ICD-10-CM

## 2021-09-20 DIAGNOSIS — E55.9 VITAMIN D DEFICIENCY: ICD-10-CM

## 2021-09-20 DIAGNOSIS — Z00.00 ENCOUNTER FOR WELLNESS EXAMINATION IN ADULT: Primary | ICD-10-CM

## 2021-09-20 DIAGNOSIS — R60.0 BILATERAL LOWER EXTREMITY EDEMA: ICD-10-CM

## 2021-09-20 DIAGNOSIS — Z85.828 HISTORY OF SQUAMOUS CELL CARCINOMA OF SKIN: ICD-10-CM

## 2021-09-20 DIAGNOSIS — Z51.81 ENCOUNTER FOR THERAPEUTIC DRUG MONITORING: ICD-10-CM

## 2021-09-20 DIAGNOSIS — I25.84 CORONARY ATHEROSCLEROSIS DUE TO CALCIFIED CORONARY LESION: ICD-10-CM

## 2021-09-20 LAB
ALBUMIN SERPL-MCNC: 3.7 G/DL (ref 3.5–5)
ALP SERPL-CCNC: 92 U/L (ref 45–120)
ALT SERPL W P-5'-P-CCNC: 17 U/L (ref 0–45)
ANION GAP SERPL CALCULATED.3IONS-SCNC: 8 MMOL/L (ref 5–18)
AST SERPL W P-5'-P-CCNC: 24 U/L (ref 0–40)
BILIRUB SERPL-MCNC: 0.8 MG/DL (ref 0–1)
BUN SERPL-MCNC: 22 MG/DL (ref 8–28)
CALCIUM SERPL-MCNC: 9.7 MG/DL (ref 8.5–10.5)
CHLORIDE BLD-SCNC: 104 MMOL/L (ref 98–107)
CHOLEST SERPL-MCNC: 124 MG/DL
CK SERPL-CCNC: 258 U/L (ref 30–190)
CO2 SERPL-SCNC: 29 MMOL/L (ref 22–31)
CREAT SERPL-MCNC: 1.09 MG/DL (ref 0.7–1.3)
ERYTHROCYTE [DISTWIDTH] IN BLOOD BY AUTOMATED COUNT: 14.5 % (ref 10–15)
FASTING STATUS PATIENT QL REPORTED: YES
GFR SERPL CREATININE-BSD FRML MDRD: 61 ML/MIN/1.73M2
GLUCOSE BLD-MCNC: 103 MG/DL (ref 70–125)
HCT VFR BLD AUTO: 36.5 % (ref 40–53)
HDLC SERPL-MCNC: 59 MG/DL
HGB BLD-MCNC: 11.9 G/DL (ref 13.3–17.7)
LDLC SERPL CALC-MCNC: 50 MG/DL
MCH RBC QN AUTO: 29.2 PG (ref 26.5–33)
MCHC RBC AUTO-ENTMCNC: 32.6 G/DL (ref 31.5–36.5)
MCV RBC AUTO: 90 FL (ref 78–100)
PLATELET # BLD AUTO: 168 10E3/UL (ref 150–450)
POTASSIUM BLD-SCNC: 4.4 MMOL/L (ref 3.5–5)
PROT SERPL-MCNC: 6.6 G/DL (ref 6–8)
RBC # BLD AUTO: 4.07 10E6/UL (ref 4.4–5.9)
SODIUM SERPL-SCNC: 141 MMOL/L (ref 136–145)
TRIGL SERPL-MCNC: 73 MG/DL
WBC # BLD AUTO: 4.9 10E3/UL (ref 4–11)

## 2021-09-20 PROCEDURE — 82306 VITAMIN D 25 HYDROXY: CPT | Performed by: INTERNAL MEDICINE

## 2021-09-20 PROCEDURE — 85027 COMPLETE CBC AUTOMATED: CPT | Performed by: INTERNAL MEDICINE

## 2021-09-20 PROCEDURE — 80053 COMPREHEN METABOLIC PANEL: CPT | Performed by: INTERNAL MEDICINE

## 2021-09-20 PROCEDURE — 36415 COLL VENOUS BLD VENIPUNCTURE: CPT | Performed by: INTERNAL MEDICINE

## 2021-09-20 PROCEDURE — 82550 ASSAY OF CK (CPK): CPT | Performed by: INTERNAL MEDICINE

## 2021-09-20 PROCEDURE — 80061 LIPID PANEL: CPT | Performed by: INTERNAL MEDICINE

## 2021-09-20 PROCEDURE — G0439 PPPS, SUBSEQ VISIT: HCPCS | Performed by: INTERNAL MEDICINE

## 2021-09-20 ASSESSMENT — ACTIVITIES OF DAILY LIVING (ADL): CURRENT_FUNCTION: NO ASSISTANCE NEEDED

## 2021-09-20 ASSESSMENT — MIFFLIN-ST. JEOR: SCORE: 1700.15

## 2021-09-20 NOTE — PATIENT INSTRUCTIONS
No change in medication treatment plan.    Continue to follow your blood pressure at home.  Pressure is averaging greater than 140/85 on a more regular basis at home, contact me.    Get your flu shot this fall.    Get your COVID-19 vaccine booster as soon as available.  You can sign up for COVID-19 vaccine booster on your [employer] computer portal.    Look on your [employer] computer portal for results from today's blood work.    Daily walking and daily back stretching exercises are recommended.  Use your walker if you feel more unsteady.  You can request a referral to physical therapy for additional back exercises in the future, if you wish.  Avoid Aleve pain medication if not needed.  Aleve can affect blood pressure, worsened leg swelling, and affect kidney function.  You can use 2 Tylenol as needed for pain instead of Aleve.

## 2021-09-20 NOTE — PROGRESS NOTES
"SUBJECTIVE:   Scott Cherry is a 87 year old male who presents for Preventive Visit.      Patient has been advised of split billing requirements and indicates understanding: Yes   Are you in the first 12 months of your Medicare coverage?  No    Healthy Habits:     In general, how would you rate your overall health?  Good    Frequency of exercise:  4-5 days/week    Duration of exercise:  30-45 minutes    Do you usually eat at least 4 servings of fruit and vegetables a day, include whole grains    & fiber and avoid regularly eating high fat or \"junk\" foods?  Yes    Taking medications regularly:  No    Barriers to taking medications:  None    Medication side effects:  None    Ability to successfully perform activities of daily living:  No assistance needed    Home Safety:  No safety concerns identified    Hearing Impairment:  No hearing concerns    In the past 6 months, have you been bothered by leaking of urine?  No    In general, how would you rate your overall mental or emotional health?  Excellent      PHQ-2 Total Score: 0    Additional concerns today:  No    Do you feel safe in your environment? Yes    Have you ever done Advance Care Planning? (For example, a Health Directive, POLST, or a discussion with a medical provider or your loved ones about your wishes): Patient is full code, discussed today.  He does not want prolonged artificial life support if catastrophic event, but does want full CODE STATUS and would defer to family for medical decision-making if needed.     Fall risk  Fallen 2 or more times in the past year?: No  Any fall with injury in the past year?: No    Cognitive Screening   1) Repeat 3 items (Leader, Season, Table)    2) Clock draw: NORMAL  3) 3 item recall: Recalls 3 objects  Results: 3 items recalled: COGNITIVE IMPAIRMENT LESS LIKELY    Mini-CogTM Copyright [employer]. Licensed by the author for use in [employer] reprinted with permission (edward@.[facility]). All rights reserved.  "     Do you have sleep apnea, excessive snoring or daytime drowsiness?: no    Reviewed and updated as needed this visit by clinical staff  Tobacco  Allergies  Meds              Reviewed and updated as needed this visit by Provider                Social History     Tobacco Use     Smoking status: Former Smoker     Smokeless tobacco: Never Used   Substance Use Topics     Alcohol use: Not on file     Alcohol Use 9/17/2021   Prescreen: >3 drinks/day or >7 drinks/week? No         87-year-old male still living independently with wife, cognitively intact.    Still walks on a daily basis.  Does the treadmill about 4 times a week.  Uses a cane.  No recent falls.  Laminectomy L2-5 in 2017.  Significant scoliosis.  He does occasionally take Aleve about 3-4 times a week.  No history of stomach ulcer or new abdominal pain.    His kidney function has been normal.    His blood pressure at home has been running 120-130/70s.  He denies orthostasis.    He continues on Lasix 20 mg a day for chronic lower extremity edema consistent with lymphedema.    2018 heart echo showed mild diastolic dysfunction with moderate to severe left atrial enlargement with just mild mitral vegetation, normal ejection fraction.    He has not had significant daytime sleepiness or sleep apnea diagnosed.    Edema has been stable.    Coronary artery disease with a non-Q wave MI in 2018.  He had 4 drug-eluting stents placed, 2 in the LAD, 1 PDA and one circumflex.  Also PTCA of OM and diagonal.    Continues on aspirin alternating 325 with 81 every other day.  Lipitor 20 mg a day.  No new generalized myalgias or new muscle weakness.    No chest pain or chest pressure.  Good exertion on the treadmill, stable ability to exert.    No palpitations or history of fainting spells.    History of prostate cancer 3+3 but with rising PSA after surgery and had radiation finishing 1 year ago.  He saw oncology in August and PSA was 1.  He is now on hormonal suppression  "treatment at this time and follows up in February with oncology.  Minimal incontinence and does take Flomax.  No dysuria or hematuria.    Polypectomy in 2014 but no further colonoscopies with age.  No blood in stool or change in bowel habits or new abdominal pain complaints.    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  Was seen in July of this year and negative exam, follow-up exam with dermatology in December.    Sees ophthalmology next month, denies vision changes or new headaches.    No mouth sores or swallowing difficulty.    No new cough or shortness of breath.  Current providers sharing in care for this patient include:   Patient Care Team:  Alejandro Redd MD as PCP - General  Alejandro Redd MD as Assigned PCP    The following health maintenance items are reviewed in Epic and correct as of today:  Health Maintenance Due   Topic Date Due     ANNUAL REVIEW OF  ORDERS  Never done     INFLUENZA VACCINE (1) 09/01/2021     FALL RISK ASSESSMENT  09/17/2021     MEDICARE ANNUAL WELLNESS VISIT  09/17/2021     Lab work is in process          Review of Systems  Constitutional, HEENT, cardiovascular, pulmonary, GI, , musculoskeletal, neuro, skin, endocrine and psych systems are negative, except as otherwise noted.    OBJECTIVE:   BP (!) 142/68   Pulse 84   Ht 1.746 m (5' 8.75\")   Wt 103.9 kg (229 lb)   BMI 34.06 kg/m   Estimated body mass index is 34.06 kg/m  as calculated from the following:    Height as of this encounter: 1.746 m (5' 8.75\").    Weight as of this encounter: 103.9 kg (229 lb).     Physical Exam  Alert and oriented x3 with good mood and affect.  Clock face drawing and word recall normal.  Mildly unsteady gait with kyphoscoliosis, but able to climb up on the exam table.  No parkinsonian signs.  Pupils and irises equal and reactive.  Extraocular muscles intact.  No jaundice or conjunctivitis.  Does wear hearing aids, minimal cerumen.  Tympanic membranes normal.  No cervical or " supraclavicular adenopathy.  No JVD or carotid bruits.  No thyromegaly or nodularity.  Lungs are clear to auscultation with good respiratory excursion.  Heart is regular with no murmur rub or gallop.  +1 ankle edema to mid shin, consistent with baseline.  Feet exam without preulcerative calluses, intact skin with good perfusion.  Abdomen is just mildly overweight but nontender no hepatosplenomegaly no pulsatile abdominal mass.  Skin exam with scarring of the scalp but no new suspicious skin lesions.  ASSESSMENT / PLAN:   (Z00.00) Encounter for wellness examination in adult  (primary encounter diagnosis)  Comment: Continue full CODE STATUS, but patient did express a wish for no prolonged artificial life support if catastrophic event.  Plan: Full Code        Routine eye exam scheduled next month.    We discussed COVID-19 vaccine booster shots which he does plan to proceed with as soon as available.    He will get a flu shot later this fall otherwise up-to-date on immunizations.    (I25.10,  I25.84) Coronary atherosclerosis due to calcified coronary lesion  Comment: Asymptomatic with good exercise tolerance on treadmill.  Continue aspirin and Lipitor.  He denies muscle weakness or abdominal pain toxicity symptoms.  Plan: Lipid Profile        Continue current treatment plan.    (R60.0) Bilateral lower extremity edema  Comment: Chronic lymphedema, stable  Plan: Continue Lasix 20 mg daily.    His blood pressure has been normal at home although mildly elevated systolic on initial check here.  Could consider increase of furosemide to twice a day if needed for edema or blood pressure control in the future.    Avoiding amlodipine with his chronic lymphedema.    (Z85.46) History of prostate cancer  Comment: Follow-up with oncology in February.  Post radiation last year.  Plan: No new pelvic or urinary symptoms.    I stressed the importance of daily walking on treadmill to keep pelvic muscle strong.    (Z85.828) History of  "squamous cell carcinoma of skin  Comment: Follow-up with dermatology in December, no evidence of recurrence at this time.  Plan:     (Z51.81) Encounter for therapeutic drug monitoring  Comment:   Plan: Comprehensive metabolic panel, CBC with         platelets, CK total            (E55.9) Vitamin D deficiency  Comment: Continue current supplement 1000 IU a day  Plan: Vitamin D Deficiency              Patient has been advised of split billing requirements and indicates understanding: No  COUNSELING:  Reviewed preventive health counseling, as reflected in patient instructions       Regular exercise    Estimated body mass index is 34.06 kg/m  as calculated from the following:    Height as of this encounter: 1.746 m (5' 8.75\").    Weight as of this encounter: 103.9 kg (229 lb).    Weight management plan: Discussed healthy diet and exercise guidelines    He reports that he has quit smoking. He has never used smokeless tobacco.      Appropriate preventive services were discussed with this patient, including applicable screening as appropriate for cardiovascular disease, diabetes, osteopenia/osteoporosis, and glaucoma.  As appropriate for age/gender, discussed screening for colorectal cancer, prostate cancer, breast cancer, and cervical cancer. Checklist reviewing preventive services available has been given to the patient.    Reviewed patients plan of care and provided an AVS. The Basic Care Plan (routine screening as documented in Health Maintenance) for Scott meets the Care Plan requirement. This Care Plan has been established and reviewed with the Patient.    Counseling Resources:  ATP IV Guidelines  Pooled Cohorts Equation Calculator  Breast Cancer Risk Calculator  Breast Cancer: Medication to Reduce Risk  FRAX Risk Assessment  ICSI Preventive Guidelines  Dietary Guidelines for Americans, 2010  USDA's MyPlate  ASA Prophylaxis  Lung CA Screening    Alejandro Redd MD  [facility] " [facility]    Identified Health Risks:

## 2021-09-21 LAB — DEPRECATED CALCIDIOL+CALCIFEROL SERPL-MC: 66 UG/L (ref 30–80)

## 2021-10-17 ENCOUNTER — HEALTH MAINTENANCE LETTER (OUTPATIENT)
Age: 86
End: 2021-10-17

## 2021-12-05 DIAGNOSIS — E78.00 HYPERCHOLESTEROLEMIA: ICD-10-CM

## 2021-12-05 DIAGNOSIS — R60.0 BILATERAL LOWER EXTREMITY EDEMA: ICD-10-CM

## 2021-12-07 RX ORDER — FUROSEMIDE 20 MG
20 TABLET ORAL DAILY
Qty: 90 TABLET | Refills: 2 | Status: SHIPPED | OUTPATIENT
Start: 2021-12-07 | End: 2022-08-03

## 2021-12-07 RX ORDER — ATORVASTATIN CALCIUM 20 MG
20 TABLET ORAL DAILY
Qty: 90 TABLET | Refills: 3 | Status: SHIPPED | OUTPATIENT
Start: 2021-12-07 | End: 2022-10-21

## 2021-12-09 ENCOUNTER — TRANSFERRED RECORDS (OUTPATIENT)
Dept: HEALTH INFORMATION MANAGEMENT | Facility: CLINIC | Age: 86
End: 2021-12-09
Payer: MEDICARE

## 2022-01-07 DIAGNOSIS — J30.9 ALLERGIC RHINITIS: ICD-10-CM

## 2022-01-10 RX ORDER — FLUTICASONE PROPIONATE 50 MCG
SPRAY, SUSPENSION (ML) NASAL
Qty: 48 G | Refills: 11 | Status: SHIPPED | OUTPATIENT
Start: 2022-01-10 | End: 2024-07-25

## 2022-01-10 NOTE — TELEPHONE ENCOUNTER
"Routing refill request to provider for review/approval because:  A break in medication    Last Written Prescription Date:  4/14/20  Last Fill Quantity: 48 g,  # refills: 11   Last office visit provider:  9/20/21     Requested Prescriptions   Pending Prescriptions Disp Refills     fluticasone (FLONASE) 50 MCG/ACT nasal spray [Pharmacy Med Name: Fluticasone Propionate 50 MCG/ACT Nasal Suspension] 48 g 11     Sig: USE 1 SPRAY NASALLY TWO  TIMES DAILY AS NEEDED FOR  HAY FEVER       Nasal Allergy Protocol Passed - 1/7/2022  9:22 AM        Passed - Patient is age 12 or older        Passed - Recent (12 mo) or future (30 days) visit within the authorizing provider's specialty     Patient has had an office visit with the authorizing provider or a provider within the authorizing providers department within the previous 12 mos or has a future within next 30 days. See \"Patient Info\" tab in inbasket, or \"Choose Columns\" in Meds & Orders section of the refill encounter.              Passed - Medication is active on med list             Swapnil Gregg RN 01/10/22 10:31 AM  "

## 2022-03-16 ENCOUNTER — MYC MEDICAL ADVICE (OUTPATIENT)
Dept: INTERNAL MEDICINE | Facility: CLINIC | Age: 87
End: 2022-03-16
Payer: MEDICARE

## 2022-03-16 DIAGNOSIS — N40.1 BENIGN PROSTATIC HYPERPLASIA WITH LOWER URINARY TRACT SYMPTOMS, SYMPTOM DETAILS UNSPECIFIED: Primary | ICD-10-CM

## 2022-03-16 DIAGNOSIS — R97.20 ELEVATED PROSTATE SPECIFIC ANTIGEN (PSA): ICD-10-CM

## 2022-03-16 RX ORDER — TAMSULOSIN HYDROCHLORIDE 0.4 MG/1
CAPSULE ORAL
Qty: 90 CAPSULE | Refills: 3 | Status: SHIPPED | OUTPATIENT
Start: 2022-03-16 | End: 2022-04-04

## 2022-03-16 RX ORDER — TAMSULOSIN HYDROCHLORIDE 0.4 MG/1
0.4 CAPSULE ORAL DAILY
Qty: 30 CAPSULE | Refills: 0 | Status: SHIPPED | OUTPATIENT
Start: 2022-03-16 | End: 2022-04-04

## 2022-04-04 ENCOUNTER — OFFICE VISIT (OUTPATIENT)
Dept: INTERNAL MEDICINE | Facility: CLINIC | Age: 87
End: 2022-04-04
Payer: MEDICARE

## 2022-04-04 VITALS
OXYGEN SATURATION: 96 % | HEART RATE: 75 BPM | WEIGHT: 225 LBS | DIASTOLIC BLOOD PRESSURE: 80 MMHG | HEIGHT: 69 IN | BODY MASS INDEX: 33.33 KG/M2 | SYSTOLIC BLOOD PRESSURE: 125 MMHG

## 2022-04-04 DIAGNOSIS — Z51.81 ENCOUNTER FOR THERAPEUTIC DRUG MONITORING: ICD-10-CM

## 2022-04-04 DIAGNOSIS — R60.0 BILATERAL LOWER EXTREMITY EDEMA: ICD-10-CM

## 2022-04-04 DIAGNOSIS — I25.84 CORONARY ARTERY DISEASE DUE TO CALCIFIED CORONARY LESION: Primary | ICD-10-CM

## 2022-04-04 DIAGNOSIS — C61 PROSTATE CANCER (H): ICD-10-CM

## 2022-04-04 DIAGNOSIS — I25.10 CORONARY ARTERY DISEASE DUE TO CALCIFIED CORONARY LESION: Primary | ICD-10-CM

## 2022-04-04 LAB
ALBUMIN SERPL-MCNC: 3.6 G/DL (ref 3.5–5)
ALP SERPL-CCNC: 91 U/L (ref 45–120)
ALT SERPL W P-5'-P-CCNC: 11 U/L (ref 0–45)
ANION GAP SERPL CALCULATED.3IONS-SCNC: 11 MMOL/L (ref 5–18)
AST SERPL W P-5'-P-CCNC: 19 U/L (ref 0–40)
BILIRUB SERPL-MCNC: 0.7 MG/DL (ref 0–1)
BUN SERPL-MCNC: 25 MG/DL (ref 8–28)
CALCIUM SERPL-MCNC: 9.9 MG/DL (ref 8.5–10.5)
CHLORIDE BLD-SCNC: 105 MMOL/L (ref 98–107)
CHOLEST SERPL-MCNC: 117 MG/DL
CO2 SERPL-SCNC: 24 MMOL/L (ref 22–31)
CREAT SERPL-MCNC: 1.18 MG/DL (ref 0.7–1.3)
FASTING STATUS PATIENT QL REPORTED: YES
GFR SERPL CREATININE-BSD FRML MDRD: 59 ML/MIN/1.73M2
GLUCOSE BLD-MCNC: 105 MG/DL (ref 70–125)
HDLC SERPL-MCNC: 55 MG/DL
LDLC SERPL CALC-MCNC: 48 MG/DL
POTASSIUM BLD-SCNC: 4.4 MMOL/L (ref 3.5–5)
PROT SERPL-MCNC: 6.9 G/DL (ref 6–8)
SODIUM SERPL-SCNC: 140 MMOL/L (ref 136–145)
TRIGL SERPL-MCNC: 71 MG/DL

## 2022-04-04 PROCEDURE — 80053 COMPREHEN METABOLIC PANEL: CPT | Performed by: INTERNAL MEDICINE

## 2022-04-04 PROCEDURE — 99214 OFFICE O/P EST MOD 30 MIN: CPT | Performed by: INTERNAL MEDICINE

## 2022-04-04 PROCEDURE — 80061 LIPID PANEL: CPT | Performed by: INTERNAL MEDICINE

## 2022-04-04 PROCEDURE — 36415 COLL VENOUS BLD VENIPUNCTURE: CPT | Performed by: INTERNAL MEDICINE

## 2022-04-04 RX ORDER — TAMSULOSIN HYDROCHLORIDE 0.4 MG/1
CAPSULE ORAL
Qty: 90 CAPSULE | Refills: 3 | Status: SHIPPED | OUTPATIENT
Start: 2022-04-04 | End: 2023-02-14

## 2022-04-04 NOTE — PROGRESS NOTES
Answers for HPI/ROS submitted by the patient on 4/1/2022  How many servings of fruits and vegetables do you eat daily?: 2-3  On average, how many sweetened beverages do you drink each day (Examples: soda, juice, sweet tea, etc.  Do NOT count diet or artificially sweetened beverages)?: 0  How many minutes a day do you exercise enough to make your heart beat faster?: 20 to 29  How many days a week do you exercise enough to make your heart beat faster?: 5  How many days per week do you miss taking your medication?: 0  What is the reason for your visit today?: 6 month checkup

## 2022-04-04 NOTE — PATIENT INSTRUCTIONS
No change in treatment plan.  Continue to walk and stay active on a daily basis.    Follow-up after September 20 for your physical exam.  You do not need to fast for that physical exam, as you were fasting today.    Get your COVID-19 vaccine booster shot at local pharmacy, you are due now for that.

## 2022-04-04 NOTE — PROGRESS NOTES
[facility] Follow Up Note    Scott Cherry   88 year old male    Date of Visit: 4/4/2022    Chief Complaint   Patient presents with     Hypertension     6 month BP check - fasting     Subjective  Darío is an 88-year-old male, living independently.  He walks on the treadmill and outside.  Walks most days of the week, least 5 days a week.  Good exertional ability.  No new falls.  He does use a cane.  No increasing shortness of breath or chest pain with activity.    Lower extremity edema stable at +1 on Lasix 20 mg a day.  No increasing shortness of breath.  Has not been diagnosed with sleep apnea.  2018 echo with mild diastolic dysfunction, mild mitral regurgitation.  Moderate to severe left atrial enlargement with normal ejection fraction.    No palpitations or history of arrhythmia.    Coronary disease with a non-Q wave MI in 2018.  2 drug-eluting stents of the LAD, one of the circumflex and 1 drug-eluting stent PDA.  PTCA of OM and diagonal in the past as well.  His blood pressure has been normal without medication.    Tolerating Lipitor 20 mg without generalized myalgias.  No epigastric pain or history of GI bleeding on aspirin.  Cholesterol was well controlled last September on current dose atorvastatin.    No flare of back pain with chronic kyphoscoliosis.  Laminectomy 2017.  No new leg radicular pain.  Normal kidney function last September.    Prostate cancer 3+3 with radiation September 2020.  He is not on hormone replacement and has not been on hormone replacement.  PSA level was stable at 0.9 last December.  Plans to see [place] oncology again in 1 year in January with PSA checks every 4 months through the oncology clinic.  Urination is adequate with Flomax.  No dysuria or UTI symptoms    No abdominal pain.    Mohs surgery for squamous cell cancer history, negative exam last December.  Plans to see them again in June.    He did see the ophthalmologist earlier this winter, no vision  "problems.  Mild double vision when accommodating vision changes when working on the computer, but that is not worsened.    No new cough.  No swallowing problems.    PMHx:    Past Medical History:   Diagnosis Date     Adenomatous colon polyp     in 2014, 5 year follow up     BPH (benign prostatic hyperplasia)     on flomax     CAD (coronary artery disease)     BMS LAD '01/ neg stress test '07/ no h/o MI     COPD (chronic obstructive pulmonary disease) (H)     mild/ former smoker     History of basal cell carcinoma of skin     yearly exam in Sept.     HTN (hypertension)      Hypercholesteremia     controlled on lipitor     Prostate cancer (H)     heriberto 3+3 on 4/14 bx.  No Tx yet, watchful waiting.      Spinal stenosis     severe L3-4, mod L4-5     PSHx:  No past surgical history on file.  Immunizations:   Immunization History   Administered Date(s) Administered     COVID-19,PF,Pfizer (12+ Yrs) 02/19/2021, 03/12/2021, 09/29/2021     FLUAD(HD)65+ QUAD 11/02/2021     Flu 65+ Years 10/15/2019     Flu, Unspecified 09/30/2009, 10/07/2010     Influenza (High Dose) 3 valent vaccine 09/25/2017, 10/24/2018     Influenza, Quad, High Dose, Pf, 65yr+ (Fluzone HD) 10/18/2020     Pneumo Conj 13-V (2010&after) 04/27/2015     Pneumococcal 23 valent 01/01/2004, 06/23/2010     Td,adult,historic,unspecified 03/05/2008     Tdap (Adacel,Boostrix) 07/25/2012     Zoster vaccine recombinant adjuvanted (SHINGRIX) 09/19/2019, 12/06/2019     Zoster vaccine, live 01/21/2009       ROS A comprehensive review of systems was performed and was otherwise negative    Medications, allergies, and problem list were reviewed and updated    Exam  /80 (BP Location: Right arm, Patient Position: Sitting, Cuff Size: Adult Large)   Pulse 75   Ht 1.746 m (5' 8.75\")   Wt 102.1 kg (225 lb)   SpO2 96%   BMI 33.47 kg/m    Alert and oriented x3.  Able to climb up on exam table.  Moderate kyphoscoliosis.  Lungs are clear.  Heart is regular without murmur.  " Abdomen is nontender.  +1 lower extremity edema bilaterally    Assessment/Plan  1. Coronary artery disease due to calcified coronary lesion  Asymptomatic.  Good exercise tolerance.  Continue aspirin and atorvastatin despite age, no evidence of toxicity.  - Lipid Profile    2. Bilateral lower extremity edema  Stable.  Consistent with venous insufficiency.  Does not have a clear diagnosis of sleep apnea and no worsening daytime sleepiness.  Continue current furosemide daily.  Blood pressure controlled.    3. Prostate cancer (H)  History of radiation 2020.  PSA level followed by oncology, low stable level.  Not on hormone replacement.  Urination adequate on Flomax  - tamsulosin (FLOMAX) 0.4 MG capsule; [TAMSULOSIN (FLOMAX) 0.4 MG CAP] TAKE 1 CAPSULE BY MOUTH AT  BEDTIME  Dispense: 90 capsule; Refill: 3    4. Encounter for therapeutic drug monitoring    - Comprehensive metabolic panel    Patient plans to get his Covid booster at local pharmacy.    Issue of squamous cell cancer of the skin, follow-up with dermatology in June      Return in about 6 months (around 9/21/2022) for Routine preventive.   Patient Instructions   No change in treatment plan.  Continue to walk and stay active on a daily basis.    Follow-up after September 20 for your physical exam.  You do not need to fast for that physical exam, as you were fasting today.    Get your COVID-19 vaccine booster shot at local pharmacy, you are due now for that.    Alejandro Redd MD, MD        Current Outpatient Medications   Medication Sig Dispense Refill     ascorbic acid, vitamin C, (ASCORBIC ACID WITH HERMILA HIPS) 500 MG tablet [ASCORBIC ACID, VITAMIN C, (ASCORBIC ACID WITH HERMILA HIPS) 500 MG TABLET] Take 500 mg by mouth daily.       aspirin 325 MG tablet [ASPIRIN 325 MG TABLET] Take 325 mg by mouth every other day.       aspirin 81 MG EC tablet [ASPIRIN 81 MG EC TABLET] Take 81 mg by mouth every other day.              cholecalciferol, vitamin D3, (VITAMIN D3) 1,000  unit capsule [CHOLECALCIFEROL, VITAMIN D3, (VITAMIN D3) 1,000 UNIT CAPSULE] Take 1,000 Units by mouth daily.              fish oil-dha-epa 1,200-144-216 mg cap [FISH OIL-DHA-EPA 1,200-144-216 MG CAP] Take 1,000 mg by mouth daily .             fluticasone (FLONASE) 50 MCG/ACT nasal spray USE 1 SPRAY NASALLY TWO  TIMES DAILY AS NEEDED FOR  HAY FEVER 48 g 11     FOLIC ACID/MULTIVITS-MIN/LUT (CENTRUM SILVER ORAL) [FOLIC ACID/MULTIVITS-MIN/LUT (CENTRUM SILVER ORAL)] Take 1 tablet by mouth daily.       furosemide (LASIX) 20 MG tablet Take 1 tablet (20 mg) by mouth daily 90 tablet 2     LIPITOR 20 MG tablet Take 1 tablet (20 mg) by mouth daily 90 tablet 3     nitroglycerin (NITROSTAT) 0.4 MG SL tablet [NITROGLYCERIN (NITROSTAT) 0.4 MG SL TABLET] DISSOLVE 1 TABLET UNDER THE TONGUE EVERY 5 MINUTES AS  NEEDED FOR CHEST PAIN. MAX  OF 3 TABLETS IN 15 MINUTES. CALL 911 IF PAIN PERSISTS. 50 tablet 3     tamsulosin (FLOMAX) 0.4 MG capsule [TAMSULOSIN (FLOMAX) 0.4 MG CAP] TAKE 1 CAPSULE BY MOUTH AT  BEDTIME 90 capsule 3     zinc 50 mg Tab [ZINC 50 MG TAB] Take 1 tablet by mouth daily.       Allergies   Allergen Reactions     Nuts - Unspecified [Nuts] Itching     Other reaction(s): Tongue Swelling, Brazil nuts     Social History     Tobacco Use     Smoking status: Former Smoker     Smokeless tobacco: Never Used   Substance Use Topics     Alcohol use: None     Drug use: None           Answers for HPI/ROS submitted by the patient on 4/1/2022  How many servings of fruits and vegetables do you eat daily?: 2-3  On average, how many sweetened beverages do you drink each day (Examples: soda, juice, sweet tea, etc.  Do NOT count diet or artificially sweetened beverages)?: 0  How many minutes a day do you exercise enough to make your heart beat faster?: 20 to 29  How many days a week do you exercise enough to make your heart beat faster?: 5  How many days per week do you miss taking your medication?: 0  What is the reason for your visit  today?: 6 month checkup

## 2022-07-07 DIAGNOSIS — I25.10 CORONARY ATHEROSCLEROSIS DUE TO CALCIFIED CORONARY LESION: ICD-10-CM

## 2022-07-07 DIAGNOSIS — I25.84 CORONARY ATHEROSCLEROSIS DUE TO CALCIFIED CORONARY LESION: ICD-10-CM

## 2022-07-08 RX ORDER — NITROGLYCERIN 0.4 MG/1
TABLET SUBLINGUAL
Qty: 50 TABLET | Refills: 3 | Status: SHIPPED | OUTPATIENT
Start: 2022-07-08 | End: 2023-10-02

## 2022-07-08 NOTE — TELEPHONE ENCOUNTER
"Routing refill request to provider for review/approval because:  Request needs review    Last Written Prescription Date:  5/10/21  Last Fill Quantity: 50,  # refills: 3   Last office visit provider:  4/4/22     Requested Prescriptions   Pending Prescriptions Disp Refills     nitroGLYcerin (NITROSTAT) 0.4 MG sublingual tablet 50 tablet 3     Sig: [NITROGLYCERIN (NITROSTAT) 0.4 MG SL TABLET] DISSOLVE 1 TABLET UNDER THE TONGUE EVERY 5 MINUTES AS  NEEDED FOR CHEST PAIN. MAX  OF 3 TABLETS IN 15 MINUTES. CALL 911 IF PAIN PERSISTS.       Nitrates Failed - 7/7/2022  8:43 AM        Failed - Sublingual nitro order needs review     If refill exceeds 1 bottle per month, please forward request to provider.           Passed - Blood pressure under 140/90 in past 12 months     BP Readings from Last 3 Encounters:   04/04/22 125/80   09/20/21 (!) 142/68   04/19/21 (!) 146/74                 Passed - Pt is not on erectile dysfunction medications        Passed - Recent (12 mo) or future (30 days) visit within the authorizing provider's specialty     Patient has had an office visit with the authorizing provider or a provider within the authorizing providers department within the previous 12 mos or has a future within next 30 days. See \"Patient Info\" tab in inbasket, or \"Choose Columns\" in Meds & Orders section of the refill encounter.              Passed - Medication is active on med list        Passed - Patient is age 18 or older             Nida Silverio RN 07/08/22 11:52 AM  "

## 2022-08-02 DIAGNOSIS — R60.0 BILATERAL LOWER EXTREMITY EDEMA: ICD-10-CM

## 2022-08-03 RX ORDER — FUROSEMIDE 20 MG
20 TABLET ORAL DAILY
Qty: 90 TABLET | Refills: 2 | Status: SHIPPED | OUTPATIENT
Start: 2022-08-03 | End: 2023-04-24

## 2022-09-22 ASSESSMENT — ENCOUNTER SYMPTOMS
WEAKNESS: 0
CHILLS: 0
PALPITATIONS: 0
DYSURIA: 0
HEARTBURN: 0
PARESTHESIAS: 0
HEMATOCHEZIA: 0
COUGH: 0
NERVOUS/ANXIOUS: 0
FEVER: 0
HEADACHES: 0
JOINT SWELLING: 0
CONSTIPATION: 0
SORE THROAT: 0
EYE PAIN: 0
DIZZINESS: 0
NAUSEA: 0
ABDOMINAL PAIN: 0
SHORTNESS OF BREATH: 0
DIARRHEA: 0
ARTHRALGIAS: 0
HEMATURIA: 0
MYALGIAS: 0
FREQUENCY: 0

## 2022-09-22 ASSESSMENT — ACTIVITIES OF DAILY LIVING (ADL): CURRENT_FUNCTION: NO ASSISTANCE NEEDED

## 2022-09-26 ENCOUNTER — OFFICE VISIT (OUTPATIENT)
Dept: INTERNAL MEDICINE | Facility: CLINIC | Age: 87
End: 2022-09-26
Payer: MEDICARE

## 2022-09-26 VITALS
HEART RATE: 74 BPM | DIASTOLIC BLOOD PRESSURE: 78 MMHG | BODY MASS INDEX: 33.18 KG/M2 | SYSTOLIC BLOOD PRESSURE: 150 MMHG | OXYGEN SATURATION: 98 % | WEIGHT: 224 LBS | HEIGHT: 69 IN

## 2022-09-26 DIAGNOSIS — G62.9 PERIPHERAL POLYNEUROPATHY: ICD-10-CM

## 2022-09-26 DIAGNOSIS — Z00.00 ENCOUNTER FOR WELLNESS EXAMINATION IN ADULT: Primary | ICD-10-CM

## 2022-09-26 DIAGNOSIS — Z85.46 HISTORY OF PROSTATE CANCER: ICD-10-CM

## 2022-09-26 DIAGNOSIS — Z51.81 ENCOUNTER FOR THERAPEUTIC DRUG MONITORING: ICD-10-CM

## 2022-09-26 DIAGNOSIS — I25.10 CORONARY ARTERY DISEASE DUE TO CALCIFIED CORONARY LESION: ICD-10-CM

## 2022-09-26 DIAGNOSIS — R60.0 BILATERAL LOWER EXTREMITY EDEMA: ICD-10-CM

## 2022-09-26 DIAGNOSIS — Z85.828 HISTORY OF SQUAMOUS CELL CARCINOMA OF SKIN: ICD-10-CM

## 2022-09-26 DIAGNOSIS — E78.00 HYPERCHOLESTEROLEMIA: ICD-10-CM

## 2022-09-26 DIAGNOSIS — I25.84 CORONARY ARTERY DISEASE DUE TO CALCIFIED CORONARY LESION: ICD-10-CM

## 2022-09-26 LAB
ALBUMIN SERPL BCG-MCNC: 4.2 G/DL (ref 3.5–5.2)
ALP SERPL-CCNC: 86 U/L (ref 40–129)
ALT SERPL W P-5'-P-CCNC: 15 U/L (ref 10–50)
ANION GAP SERPL CALCULATED.3IONS-SCNC: 7 MMOL/L (ref 7–15)
AST SERPL W P-5'-P-CCNC: 24 U/L (ref 10–50)
BASOPHILS # BLD AUTO: 0 10E3/UL (ref 0–0.2)
BASOPHILS NFR BLD AUTO: 1 %
BILIRUB SERPL-MCNC: 0.5 MG/DL
BUN SERPL-MCNC: 21.2 MG/DL (ref 8–23)
CALCIUM SERPL-MCNC: 9.6 MG/DL (ref 8.8–10.2)
CHLORIDE SERPL-SCNC: 101 MMOL/L (ref 98–107)
CHOLEST SERPL-MCNC: 119 MG/DL
CREAT SERPL-MCNC: 1.17 MG/DL (ref 0.67–1.17)
DEPRECATED HCO3 PLAS-SCNC: 29 MMOL/L (ref 22–29)
EOSINOPHIL # BLD AUTO: 0.3 10E3/UL (ref 0–0.7)
EOSINOPHIL NFR BLD AUTO: 5 %
ERYTHROCYTE [DISTWIDTH] IN BLOOD BY AUTOMATED COUNT: 14.5 % (ref 10–15)
GFR SERPL CREATININE-BSD FRML MDRD: 60 ML/MIN/1.73M2
GLUCOSE SERPL-MCNC: 109 MG/DL (ref 70–99)
HCT VFR BLD AUTO: 38.6 % (ref 40–53)
HDLC SERPL-MCNC: 63 MG/DL
HGB BLD-MCNC: 12.6 G/DL (ref 13.3–17.7)
IMM GRANULOCYTES # BLD: 0 10E3/UL
IMM GRANULOCYTES NFR BLD: 0 %
LDLC SERPL CALC-MCNC: 41 MG/DL
LYMPHOCYTES # BLD AUTO: 1.2 10E3/UL (ref 0.8–5.3)
LYMPHOCYTES NFR BLD AUTO: 24 %
MCH RBC QN AUTO: 29.6 PG (ref 26.5–33)
MCHC RBC AUTO-ENTMCNC: 32.6 G/DL (ref 31.5–36.5)
MCV RBC AUTO: 91 FL (ref 78–100)
MONOCYTES # BLD AUTO: 0.6 10E3/UL (ref 0–1.3)
MONOCYTES NFR BLD AUTO: 11 %
NEUTROPHILS # BLD AUTO: 2.9 10E3/UL (ref 1.6–8.3)
NEUTROPHILS NFR BLD AUTO: 58 %
NONHDLC SERPL-MCNC: 56 MG/DL
PLATELET # BLD AUTO: 160 10E3/UL (ref 150–450)
POTASSIUM SERPL-SCNC: 4.4 MMOL/L (ref 3.4–5.3)
PROT SERPL-MCNC: 7 G/DL (ref 6.4–8.3)
RBC # BLD AUTO: 4.26 10E6/UL (ref 4.4–5.9)
SODIUM SERPL-SCNC: 137 MMOL/L (ref 136–145)
TRIGL SERPL-MCNC: 73 MG/DL
WBC # BLD AUTO: 5 10E3/UL (ref 4–11)

## 2022-09-26 PROCEDURE — 85025 COMPLETE CBC W/AUTO DIFF WBC: CPT | Performed by: INTERNAL MEDICINE

## 2022-09-26 PROCEDURE — 80061 LIPID PANEL: CPT | Performed by: INTERNAL MEDICINE

## 2022-09-26 PROCEDURE — G0439 PPPS, SUBSEQ VISIT: HCPCS | Performed by: INTERNAL MEDICINE

## 2022-09-26 PROCEDURE — 80053 COMPREHEN METABOLIC PANEL: CPT | Performed by: INTERNAL MEDICINE

## 2022-09-26 PROCEDURE — 99214 OFFICE O/P EST MOD 30 MIN: CPT | Mod: 25 | Performed by: INTERNAL MEDICINE

## 2022-09-26 PROCEDURE — 36415 COLL VENOUS BLD VENIPUNCTURE: CPT | Performed by: INTERNAL MEDICINE

## 2022-09-26 ASSESSMENT — ENCOUNTER SYMPTOMS
DYSURIA: 0
NERVOUS/ANXIOUS: 0
WEAKNESS: 0
HEMATOCHEZIA: 0
HEARTBURN: 0
HEMATURIA: 0
COUGH: 0
SHORTNESS OF BREATH: 0
HEADACHES: 0
MYALGIAS: 0
FEVER: 0
CONSTIPATION: 0
DIZZINESS: 0
NAUSEA: 0
EYE PAIN: 0
PARESTHESIAS: 0
CHILLS: 0
ABDOMINAL PAIN: 0
JOINT SWELLING: 0
DIARRHEA: 0
PALPITATIONS: 0
SORE THROAT: 0
ARTHRALGIAS: 0
FREQUENCY: 0

## 2022-09-26 ASSESSMENT — ACTIVITIES OF DAILY LIVING (ADL): CURRENT_FUNCTION: NO ASSISTANCE NEEDED

## 2022-09-26 NOTE — PROGRESS NOTES
SUBJECTIVE:   Ron is a 88 year old who presents for Preventive Visit.  Lives independently with wife.  Chronic lower extremity edema with venous insufficiency and peripheral neuropathy with unsteady gait but no recent falls within the past year.  Still using canes for walking.  Does walk on the treadmill most days.  Stable exertional ability.    No increasing shortness of breath or chest pain or palpitations.    2018 heart echo with mild diastolic dysfunction and mild mitral regurgitation.  Saw cardiology in July of this year, no change in treatment plan 1 year follow-up.    Furosemide 20 mg a day.  No orthostasis.    Blood pressure at home running 130s over 70s.    He denies daytime sleepiness and has not been diagnosed with sleep apnea.    No chest pain or chest pressure.  Coronary disease with a non-Q wave MI in 2018 with 2 drug-eluting stents to the LAD and one of the circumflex, 1 PDA.  On Lipitor 20 mg without generalized diffuse myalgias.  Aspirin daily without epigastric pain or blood in stool or melena.  No history of GI bleeding.    April thousand 22 cholesterol was well controlled with normal liver tests and creatinine.    Is not been on blood pressure medication other than the furosemide.    Kyphoscoliosis with chronic back pain but no flare.  Laminectomy in 2017 L2-5.    He had surgery 2014 for 3+3 prostate cancer with radiation in 2020 for recurrence.  PSA level has been stable and low level, followed through the oncology clinic.  No new urinary symptoms.  Not on current treatment other than Flomax.    Squamous cell cancer with recent squamous cell treatment this past summer.  He has follow-up with dermatology in December.  No new skin lesion complaints today.    Polypectomy of the colon in 2014 but no further colonoscopies planned with age.  No change in bowels or blood in stool or abdominal pain.    Ophthalmology about 4 months ago with no vision changes, 1 year follow-up.  No new headaches or  "vision changes.  No problem swallowing.  No new cough.    No headache complaints.    Patient has been advised of split billing requirements and indicates understanding: Yes  Are you in the first 12 months of your Medicare coverage?  No    Healthy Habits:     In general, how would you rate your overall health?  Good    Frequency of exercise:  2-3 days/week    Do you usually eat at least 4 servings of fruit and vegetables a day, include whole grains    & fiber and avoid regularly eating high fat or \"junk\" foods?  Yes    Ability to successfully perform activities of daily living:  No assistance needed    Home Safety:  No safety concerns identified    Hearing Impairment:  No hearing concerns    In the past 6 months, have you been bothered by leaking of urine?  No    In general, how would you rate your overall mental or emotional health?  Good      PHQ-2 Total Score: 0    Additional concerns today:  No    Do you feel safe in your environment? Yes    Have you ever done Advance Care Planning? (For example, a Health Directive, POLST, or a discussion with a medical provider or your loved ones about your wishes): No, advance care planning information given to patient to review.  Patient declined advance care planning discussion at this time.      Fall risk  Fallen 2 or more times in the past year?: No  Any fall with injury in the past year?: No  1  Cognitive Screening   1) Repeat 3 items (Leader, Season, Table)    2) Clock draw: NORMAL  3) 3 item recall: Recalls 3 objects  Results: 3 items recalled: COGNITIVE IMPAIRMENT LESS LIKELY    Mini-CogTM Copyright TAMARA Pak. Licensed by the author for use in [facility]; reprinted with permission (edward@.[facility]). All rights reserved.      Do you have sleep apnea, excessive snoring or daytime drowsiness?: no    Reviewed and updated as needed this visit by clinical staff   Tobacco  Allergies  Meds                Reviewed and updated as needed this visit by Provider          "          Social History     Tobacco Use     Smoking status: Former Smoker     Smokeless tobacco: Never Used   Substance Use Topics     Alcohol use: Not on file     If you drink alcohol do you typically have >3 drinks per day or >7 drinks per week? No    Alcohol Use 9/22/2022   Prescreen: >3 drinks/day or >7 drinks/week? No   No flowsheet data found.        Current providers sharing in care for this patient include:   Patient Care Team:  Alejandro Redd MD as PCP - General  Alejandro Redd MD as Assigned PCP    The following health maintenance items are reviewed in Epic and correct as of today:  Health Maintenance   Topic Date Due     COVID-19 Vaccine (5 - Booster for Pfizer series) 06/06/2022     DTAP/TDAP/TD IMMUNIZATION (2 - Td or Tdap) 07/25/2022     INFLUENZA VACCINE (1) 09/01/2022     ANNUAL REVIEW OF HM ORDERS  04/04/2023     MEDICARE ANNUAL WELLNESS VISIT  09/26/2023     FALL RISK ASSESSMENT  09/26/2023     ADVANCE CARE PLANNING  09/17/2025     PHQ-2 (once per calendar year)  Completed     Pneumococcal Vaccine: 65+ Years  Completed     ZOSTER IMMUNIZATION  Completed     IPV IMMUNIZATION  Aged Out     MENINGITIS IMMUNIZATION  Aged Out     HEPATITIS B IMMUNIZATION  Aged Out     Lab work is in process  Patient Active Problem List   Diagnosis     Foot Pain (Soft Tissue)     Essential Hypercholesterolemia     Coronary Artery Disease     Serology Prostate-specific Antigen (PSA) Elevated     Cough     Acute Gingivitis     Aphthous Ulcer     Benign Adenomatous Polyp Of The Large Intestine     Unspecified cataract     Prostate cancer (H)     Spinal stenosis, lumbar     Medication monitoring encounter     History of squamous cell carcinoma of skin     Sensorineural hearing loss, bilateral     No past surgical history on file.    Social History     Tobacco Use     Smoking status: Former Smoker     Smokeless tobacco: Never Used   Substance Use Topics     Alcohol use: Not on file     No family history on file.       Current Outpatient Medications   Medication Sig Dispense Refill     ascorbic acid, vitamin C, (ASCORBIC ACID WITH HERMILA HIPS) 500 MG tablet [ASCORBIC ACID, VITAMIN C, (ASCORBIC ACID WITH HERMILA HIPS) 500 MG TABLET] Take 500 mg by mouth daily.       aspirin 81 MG EC tablet [ASPIRIN 81 MG EC TABLET] Take 81 mg by mouth every other day.              cholecalciferol, vitamin D3, (VITAMIN D3) 1,000 unit capsule [CHOLECALCIFEROL, VITAMIN D3, (VITAMIN D3) 1,000 UNIT CAPSULE] Take 1,000 Units by mouth daily.              fish oil-dha-epa 1,200-144-216 mg cap [FISH OIL-DHA-EPA 1,200-144-216 MG CAP] Take 1,000 mg by mouth daily .             fluticasone (FLONASE) 50 MCG/ACT nasal spray USE 1 SPRAY NASALLY TWO  TIMES DAILY AS NEEDED FOR  HAY FEVER 48 g 11     FOLIC ACID/MULTIVITS-MIN/LUT (CENTRUM SILVER ORAL) [FOLIC ACID/MULTIVITS-MIN/LUT (CENTRUM SILVER ORAL)] Take 1 tablet by mouth daily.       furosemide (LASIX) 20 MG tablet Take 1 tablet (20 mg) by mouth daily 90 tablet 2     LIPITOR 20 MG tablet Take 1 tablet (20 mg) by mouth daily 90 tablet 3     nitroGLYcerin (NITROSTAT) 0.4 MG sublingual tablet [NITROGLYCERIN (NITROSTAT) 0.4 MG SL TABLET] DISSOLVE 1 TABLET UNDER THE TONGUE EVERY 5 MINUTES AS  NEEDED FOR CHEST PAIN. MAX  OF 3 TABLETS IN 15 MINUTES. CALL 911 IF PAIN PERSISTS. 50 tablet 3     tamsulosin (FLOMAX) 0.4 MG capsule [TAMSULOSIN (FLOMAX) 0.4 MG CAP] TAKE 1 CAPSULE BY MOUTH AT  BEDTIME 90 capsule 3     zinc 50 mg Tab [ZINC 50 MG TAB] Take 1 tablet by mouth daily.       Allergies   Allergen Reactions     Nuts - Unspecified [Nuts] Itching     Other reaction(s): Tongue Swelling, Brazil nuts         Review of Systems   Constitutional: Negative for chills and fever.   HENT: Negative for congestion, ear pain, hearing loss and sore throat.    Eyes: Negative for pain and visual disturbance.   Respiratory: Negative for cough and shortness of breath.    Cardiovascular: Negative for chest pain, palpitations and peripheral  "edema.   Gastrointestinal: Negative for abdominal pain, constipation, diarrhea, heartburn, hematochezia and nausea.   Genitourinary: Negative for dysuria, frequency, genital sores, hematuria, impotence and urgency.   Musculoskeletal: Negative for arthralgias, joint swelling and myalgias.   Skin: Negative for rash.   Neurological: Negative for dizziness, weakness, headaches and paresthesias.   Psychiatric/Behavioral: Negative for mood changes. The patient is not nervous/anxious.        OBJECTIVE:   BP (!) 168/84 (BP Location: Right arm, Patient Position: Sitting, Cuff Size: Adult Regular)   Pulse 74   Ht 1.753 m (5' 9\")   Wt 101.6 kg (224 lb)   SpO2 98%   BMI 33.08 kg/m   Estimated body mass index is 33.08 kg/m  as calculated from the following:    Height as of this encounter: 1.753 m (5' 9\").    Weight as of this encounter: 101.6 kg (224 lb).  Physical Exam  Alert and oriented x3.  Normal cognition.  Clock face drawing normal and word recall normal.  Moderately unsteady gait with peripheral neuropathy, is able to climb up on exam table.  Pupils and irises equal and reactive.  Extraocular muscles intact.  No jaundice or conjunctivitis.  External ears and nose exam is normal.  Wears hearing aids.  Minimal cerumen and normal tympanic membranes.  No cervical or supraclavicular or axillary adenopathy.  No JVD and no carotid bruits.  No thyromegaly or nodularity.  Lungs clear to auscultation with good respiratory excursion.  Heart is regular without murmur.  +1 pedal pulses bilaterally.  Denies any foot sores.  Abdomen is nonobese nontender no hepatosplenomegaly and no pulsatile abdominal mass.  Skin exam with multiple seborrheic keratosis butno suspicious skin lesions noted.    ASSESSMENT / PLAN:   (Z00.00) Encounter for wellness examination in adult  (primary encounter diagnosis)  Comment: Main focus for patient is maintaining mobility with regular walking which she is doing.  Cognition is good.    Cardiovascular " "disease without active symptoms.    I encouraged him to continue to walk, he was given a prescription for four-wheel walker  Plan: Routine eye exam yearly in the spring.    He was told he is due for a tetanus shot.    He wanted to wait and get his flu shot and COVID shot with wife later this fall.    No further colon cancer screening with age    (I25.10,  I25.84) Coronary artery disease due to calcified coronary lesion  Comment: Asymptomatic.  Continue aspirin and atorvastatin, with previous stents.  Plan: Goal LDL less than 80    (E78.00) Hypercholesterolemia  Comment: Continue current atorvastatin, no evidence of toxicity  Plan: Lipid Profile            (R60.0) Bilateral lower extremity edema  Comment: Stable, continue current furosemide  Plan: Consistent with venous insufficiency    (Z85.46) History of prostate cancer  Comment: Watchful waiting after radiation in 2020  Plan: Low level PSA, stable.  Follow with oncology.  On Flomax    (Z85.828) History of squamous cell carcinoma of skin  Comment: Follow-up with dermatology this December  Plan:     (Z51.81) Encounter for therapeutic drug monitoring  Comment:   Plan: CBC with Platelets & Differential,         Comprehensive metabolic panel            (G62.9) Peripheral polyneuropathy  Comment: Stable.  Unsteady gait.  I encouraged him to get a four-wheel walker with seat  Plan: Walker Order for DME - ONLY FOR DME                COUNSELING:  Reviewed preventive health counseling, as reflected in patient instructions       Regular exercise       Healthy diet/nutrition    Estimated body mass index is 33.08 kg/m  as calculated from the following:    Height as of this encounter: 1.753 m (5' 9\").    Weight as of this encounter: 101.6 kg (224 lb).    Weight management plan: Discussed healthy diet and exercise guidelines    He reports that he has quit smoking. He has never used smokeless tobacco.      Appropriate preventive services were discussed with this patient, " including applicable screening as appropriate for cardiovascular disease, diabetes, osteopenia/osteoporosis, and glaucoma.  As appropriate for age/gender, discussed screening for colorectal cancer, prostate cancer, breast cancer, and cervical cancer. Checklist reviewing preventive services available has been given to the patient.    Reviewed patients plan of care and provided an AVS. The Basic Care Plan (routine screening as documented in Health Maintenance) for Scott meets the Care Plan requirement. This Care Plan has been established and reviewed with the Patient.    Counseling Resources:  ATP IV Guidelines  Pooled Cohorts Equation Calculator  Breast Cancer Risk Calculator  Breast Cancer: Medication to Reduce Risk  FRAX Risk Assessment  ICSI Preventive Guidelines  Dietary Guidelines for Americans, 2010  USDA's MyPlate  ASA Prophylaxis  Lung CA Screening    Alejandro Redd MD  [facility]    Identified Health Risks:

## 2022-09-26 NOTE — PATIENT INSTRUCTIONS
No change in medication treatment plan.    Continue to stay active and walk on a daily basis.    Get a 4 wheeled walker with seat to help improve your mobility.    Continue to check PSA levels through your oncology clinic.    See dermatology this December as planned.    Continue to see ophthalmology yearly for eye check.    Get your flu shot and COVID booster shot this fall.    You are due for a tetanus shot as well.    Goal blood pressure less than 140/85.    Follow-up in the spring for a blood pressure recheck appointment.  You do not need to fast for that appointment.

## 2022-10-02 ENCOUNTER — HEALTH MAINTENANCE LETTER (OUTPATIENT)
Age: 87
End: 2022-10-02

## 2022-10-21 DIAGNOSIS — E78.00 HYPERCHOLESTEROLEMIA: ICD-10-CM

## 2022-10-21 RX ORDER — ATORVASTATIN CALCIUM 20 MG
20 TABLET ORAL DAILY
Qty: 90 TABLET | Refills: 3 | Status: SHIPPED | OUTPATIENT
Start: 2022-10-21 | End: 2024-04-03

## 2022-12-08 ENCOUNTER — TRANSFERRED RECORDS (OUTPATIENT)
Dept: HEALTH INFORMATION MANAGEMENT | Facility: CLINIC | Age: 87
End: 2022-12-08

## 2023-02-12 DIAGNOSIS — C61 PROSTATE CANCER (H): ICD-10-CM

## 2023-02-13 NOTE — TELEPHONE ENCOUNTER
"Routing refill request to provider for review/approval because:  Blood pressure outside protocol parameters    Last Written Prescription Date:  4/4/2022  Last Fill Quantity: 90,  # refills: 3   Last office visit provider:  9/26/2022     Requested Prescriptions   Pending Prescriptions Disp Refills     tamsulosin (FLOMAX) 0.4 MG capsule 90 capsule 3     Sig: [TAMSULOSIN (FLOMAX) 0.4 MG CAP] TAKE 1 CAPSULE BY MOUTH AT  BEDTIME       Alpha Blockers Failed - 2/12/2023 11:01 AM        Failed - Blood pressure under 140/90 in past 12 months     BP Readings from Last 3 Encounters:   09/26/22 (!) 150/78   04/04/22 125/80   09/20/21 (!) 142/68                 Passed - Recent (12 mo) or future (30 days) visit within the authorizing provider's specialty     Patient has had an office visit with the authorizing provider or a provider within the authorizing providers department within the previous 12 mos or has a future within next 30 days. See \"Patient Info\" tab in inbasket, or \"Choose Columns\" in Meds & Orders section of the refill encounter.              Passed - Patient does not have Tadalafil, Vardenafil, or Sildenafil on their medication list        Passed - Medication is active on med list        Passed - Patient is 18 years of age or older             Sharon Briggs RN 02/13/23 4:20 PM      "

## 2023-02-14 RX ORDER — TAMSULOSIN HYDROCHLORIDE 0.4 MG/1
CAPSULE ORAL
Qty: 90 CAPSULE | Refills: 3 | Status: SHIPPED | OUTPATIENT
Start: 2023-02-14 | End: 2024-01-17

## 2023-03-27 ENCOUNTER — OFFICE VISIT (OUTPATIENT)
Dept: INTERNAL MEDICINE | Facility: CLINIC | Age: 88
End: 2023-03-27
Payer: MEDICARE

## 2023-03-27 VITALS
HEIGHT: 69 IN | WEIGHT: 222 LBS | BODY MASS INDEX: 32.88 KG/M2 | DIASTOLIC BLOOD PRESSURE: 82 MMHG | TEMPERATURE: 98.3 F | OXYGEN SATURATION: 98 % | SYSTOLIC BLOOD PRESSURE: 142 MMHG | HEART RATE: 78 BPM | RESPIRATION RATE: 14 BRPM

## 2023-03-27 DIAGNOSIS — C61 PROSTATE CANCER (H): ICD-10-CM

## 2023-03-27 DIAGNOSIS — I25.84 CORONARY ARTERY DISEASE DUE TO CALCIFIED CORONARY LESION: Primary | ICD-10-CM

## 2023-03-27 DIAGNOSIS — G62.9 PERIPHERAL POLYNEUROPATHY: ICD-10-CM

## 2023-03-27 DIAGNOSIS — R60.0 BILATERAL LOWER EXTREMITY EDEMA: ICD-10-CM

## 2023-03-27 DIAGNOSIS — I25.10 CORONARY ARTERY DISEASE DUE TO CALCIFIED CORONARY LESION: Primary | ICD-10-CM

## 2023-03-27 DIAGNOSIS — Z85.828 HISTORY OF SQUAMOUS CELL CARCINOMA OF SKIN: ICD-10-CM

## 2023-03-27 DIAGNOSIS — R73.9 HYPERGLYCEMIA: ICD-10-CM

## 2023-03-27 DIAGNOSIS — Z51.81 ENCOUNTER FOR THERAPEUTIC DRUG MONITORING: ICD-10-CM

## 2023-03-27 LAB
ALBUMIN SERPL BCG-MCNC: 4.2 G/DL (ref 3.5–5.2)
ALP SERPL-CCNC: 80 U/L (ref 40–129)
ALT SERPL W P-5'-P-CCNC: 15 U/L (ref 10–50)
ANION GAP SERPL CALCULATED.3IONS-SCNC: 9 MMOL/L (ref 7–15)
AST SERPL W P-5'-P-CCNC: 24 U/L (ref 10–50)
BILIRUB SERPL-MCNC: 0.6 MG/DL
BUN SERPL-MCNC: 27.7 MG/DL (ref 8–23)
CALCIUM SERPL-MCNC: 9.9 MG/DL (ref 8.8–10.2)
CHLORIDE SERPL-SCNC: 104 MMOL/L (ref 98–107)
CHOLEST SERPL-MCNC: 129 MG/DL
CREAT SERPL-MCNC: 1.18 MG/DL (ref 0.67–1.17)
DEPRECATED HCO3 PLAS-SCNC: 26 MMOL/L (ref 22–29)
GFR SERPL CREATININE-BSD FRML MDRD: 59 ML/MIN/1.73M2
GLUCOSE SERPL-MCNC: 108 MG/DL (ref 70–99)
HBA1C MFR BLD: 5.5 % (ref 0–5.6)
HDLC SERPL-MCNC: 63 MG/DL
LDLC SERPL CALC-MCNC: 48 MG/DL
NONHDLC SERPL-MCNC: 66 MG/DL
POTASSIUM SERPL-SCNC: 5 MMOL/L (ref 3.4–5.3)
PROT SERPL-MCNC: 7.1 G/DL (ref 6.4–8.3)
SODIUM SERPL-SCNC: 139 MMOL/L (ref 136–145)
TRIGL SERPL-MCNC: 92 MG/DL
VIT B12 SERPL-MCNC: 448 PG/ML (ref 232–1245)

## 2023-03-27 PROCEDURE — 36415 COLL VENOUS BLD VENIPUNCTURE: CPT | Performed by: INTERNAL MEDICINE

## 2023-03-27 PROCEDURE — 80061 LIPID PANEL: CPT | Performed by: INTERNAL MEDICINE

## 2023-03-27 PROCEDURE — 83036 HEMOGLOBIN GLYCOSYLATED A1C: CPT | Performed by: INTERNAL MEDICINE

## 2023-03-27 PROCEDURE — 82607 VITAMIN B-12: CPT | Performed by: INTERNAL MEDICINE

## 2023-03-27 PROCEDURE — 80053 COMPREHEN METABOLIC PANEL: CPT | Performed by: INTERNAL MEDICINE

## 2023-03-27 PROCEDURE — 99214 OFFICE O/P EST MOD 30 MIN: CPT | Performed by: INTERNAL MEDICINE

## 2023-03-27 NOTE — PATIENT INSTRUCTIONS
No change in medication treatment plan.    Continue your regular exercise with the exercise bike and/or walking.    No change in medication treatment plan at this time.  If blood pressure begins running more than 150/85, contact me to consider additional medication.    If you have increasing leg swelling, or higher blood pressure, you can take an extra dose of furosemide that day.    Follow-up after September 26 for your adult wellness visit physical exam.

## 2023-03-27 NOTE — PROGRESS NOTES
Scott Cherry   89 year old male    Date of Visit: 3/27/2023    Chief Complaint   Patient presents with     Follow Up     6 month check - fasting     Subjective  89-year-old cognitively intact and living independently with wife.  He is going down to the exercise room almost every day and doing an exercise bike for 30 minutes.  He has significant peripheral neuropathy and unsteady gait but no falls.  Now using a walker.    No increasing shortness of breath.  Edema controlled.  On Lasix 20 mg a day.    2018 heart echo showed mild diastolic dysfunction mild mitral regurgitation.    He does not have the diagnosis of sleep apnea and denies worsening daytime sleepiness but does have kyphoscoliosis.    Continues on furosemide for edema, has not taken extra doses.    His blood pressure at home has been consistently in the 130/70 range and he does take it about every 2 weeks.  His last appointment in December at oncology blood pressure was 132/76.    He is watchful waiting for prostate cancer 3+3 with previous radiation for recurrence 2020.  November 2022 PSA level was 0.3.  No new urinary symptoms.  He is on Flomax.  Follow-up appointment with oncology for PSA recheck will be due in May.    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  No new skin lesion concerns at this time.    He saw ophthalmology last summer, no new vision concerns.    No new cough or shortness of breath.    I did review labs from September 2022 with an LDL of 41 on Lipitor 20 mg.    Blood sugar 109.  Otherwise normal creatinine and liver test.    No epigastric pain or bleeding on aspirin.    No chest pain chest pressure or increasing shortness of breath.  Non-Q wave MI in 2018.  2 drug-eluting stents to the LAD, one of the circumflex and one of the PDA.    No flare of his back pain with laminectomy in 2017 L2-5.  No falls.        PMHx:    Past  "Medical History:   Diagnosis Date     Adenomatous colon polyp     in 2014, 5 year follow up     BPH (benign prostatic hyperplasia)     on flomax     CAD (coronary artery disease)     BMS LAD '01/ neg stress test '07/ no h/o MI     COPD (chronic obstructive pulmonary disease) (H)     mild/ former smoker     History of basal cell carcinoma of skin     yearly exam in Sept.     HTN (hypertension)      Hypercholesteremia     controlled on lipitor     Prostate cancer (H)     heriberto 3+3 on 4/14 bx.  No Tx yet, watchful waiting.      Spinal stenosis     severe L3-4, mod L4-5     PSHx:  No past surgical history on file.  Immunizations:   Immunization History   Administered Date(s) Administered     COVID-19 Vaccine 12+ (Pfizer 2022) 04/11/2022     COVID-19 Vaccine 12+ (Pfizer) 02/19/2021, 03/12/2021, 09/29/2021     COVID-19 Vaccine Bivalent Booster 12+ (Pfizer) 09/30/2022     FLUAD(HD)65+ QUAD 11/02/2021     Flu 65+ Years 10/15/2019     Flu, Unspecified 09/30/2009, 10/07/2010     Influenza (High Dose) 3 valent vaccine 09/25/2017, 10/24/2018     Influenza Vaccine 65+ (Fluzone HD) 10/18/2020, 10/17/2022     Pneumo Conj 13-V (2010&after) 04/27/2015     Pneumococcal 23 valent 01/01/2004, 06/23/2010     TDAP (Adacel,Boostrix) 07/25/2012, 02/13/2023     Td,adult,historic,unspecified 03/05/2008     Zoster recombinant adjuvanted (SHINGRIX) 09/19/2019, 12/06/2019     Zoster vaccine, live 01/21/2009       ROS A comprehensive review of systems was performed and was otherwise negative    Medications, allergies, and problem list were reviewed and updated    Exam  BP (!) 142/82   Pulse 78   Temp 98.3  F (36.8  C)   Resp 14   Ht 1.753 m (5' 9\")   Wt 100.7 kg (222 lb)   SpO2 98%   BMI 32.78 kg/m    Appears well.  Lungs clear.  Heart regular without murmur.  +1 ankle edema bilaterally.    Blood pressure was initially high but did come down on my recheck.  Abdomen is nontender.  Able clinical exam table.    Assessment/Plan  1. Coronary " artery disease due to calcified coronary lesion  Asymptomatic with good exercise tolerance with bike.  Continue Lipitor and aspirin.  - Lipid Profile    2. Bilateral lower extremity edema  Chronic lower extremity edema with venous insufficiency.  History of pelvic radiation.    Not having worsening daytime sleepiness, although possibility of some sleep apnea.    Blood pressure has been controlled on home checks.  See patient instructions for monitoring blood pressure.  Could take extra furosemide if needed.    3. Prostate cancer (H)  Watchful waiting for low-level recurrence after radiation.  Follow-up with oncology in May    4. History of squamous cell carcinoma of skin  History of recurrent skin cancer, due for 6-month follow-up in July    5. Hyperglycemia  Diet is reasonable.  Exercises regularly.  If significantly high hemoglobin A1c, may need to reduce simple carbohydrates in diet  - Hemoglobin A1c    6. Encounter for therapeutic drug monitoring  Furosemide therapy  - Comprehensive metabolic panel    7. Peripheral polyneuropathy  Stable, no foot sores.  Uses walker.  I did fill out a handicap parking sticker form for patient.  - Vitamin B12      No follow-ups on file.   Patient Instructions   No change in medication treatment plan.    Continue your regular exercise with the exercise bike and/or walking.    No change in medication treatment plan at this time.  If blood pressure begins running more than 150/85, contact me to consider additional medication.    If you have increasing leg swelling, or higher blood pressure, you can take an extra dose of furosemide that day.    Follow-up after September 26 for your adult wellness visit physical exam.    Alejandro Redd MD, MD        Current Outpatient Medications   Medication Sig Dispense Refill     ascorbic acid, vitamin C, (ASCORBIC ACID WITH HERMILA HIPS) 500 MG tablet [ASCORBIC ACID, VITAMIN C, (ASCORBIC ACID WITH HERMILA HIPS) 500 MG TABLET] Take 500 mg by mouth daily.        aspirin 81 MG EC tablet [ASPIRIN 81 MG EC TABLET] Take 81 mg by mouth every other day.              cholecalciferol, vitamin D3, (VITAMIN D3) 1,000 unit capsule [CHOLECALCIFEROL, VITAMIN D3, (VITAMIN D3) 1,000 UNIT CAPSULE] Take 1,000 Units by mouth daily.              fish oil-dha-epa 1,200-144-216 mg cap [FISH OIL-DHA-EPA 1,200-144-216 MG CAP] Take 1,000 mg by mouth daily .             fluticasone (FLONASE) 50 MCG/ACT nasal spray USE 1 SPRAY NASALLY TWO  TIMES DAILY AS NEEDED FOR  HAY FEVER 48 g 11     FOLIC ACID/MULTIVITS-MIN/LUT (CENTRUM SILVER ORAL) [FOLIC ACID/MULTIVITS-MIN/LUT (CENTRUM SILVER ORAL)] Take 1 tablet by mouth daily.       furosemide (LASIX) 20 MG tablet Take 1 tablet (20 mg) by mouth daily 90 tablet 2     LIPITOR 20 MG tablet Take 1 tablet (20 mg) by mouth daily 90 tablet 3     nitroGLYcerin (NITROSTAT) 0.4 MG sublingual tablet [NITROGLYCERIN (NITROSTAT) 0.4 MG SL TABLET] DISSOLVE 1 TABLET UNDER THE TONGUE EVERY 5 MINUTES AS  NEEDED FOR CHEST PAIN. MAX  OF 3 TABLETS IN 15 MINUTES. CALL 911 IF PAIN PERSISTS. 50 tablet 3     tamsulosin (FLOMAX) 0.4 MG capsule [TAMSULOSIN (FLOMAX) 0.4 MG CAP] TAKE 1 CAPSULE BY MOUTH AT  BEDTIME 90 capsule 3     zinc 50 mg Tab [ZINC 50 MG TAB] Take 1 tablet by mouth daily.       Allergies   Allergen Reactions     Nuts - Unspecified [Nuts] Itching     Other reaction(s): Tongue Swelling, Brazil nuts     Social History     Tobacco Use     Smoking status: Former     Smokeless tobacco: Never   Vaping Use     Vaping Use: Never used             Abbi Velasquez is a 89 year old, presenting for the following health issues:  Follow Up (6 month check - fasting)    Additional Questions 3/27/2023   Roomed by Janet Umaña 3/27/2023   Any forms needing to be completed Yes     History of Present Illness       Reason for visit:  6 Month Routine Visit.    He eats 2-3 servings of fruits and vegetables daily.He consumes 0 sweetened beverage(s) daily.He exercises with  enough effort to increase his heart rate 30 to 60 minutes per day.  He exercises with enough effort to increase his heart rate 6 days per week.   He is taking medications regularly.               Review of Systems         Objective    There were no vitals taken for this visit.  There is no height or weight on file to calculate BMI.  Physical Exam

## 2023-04-22 DIAGNOSIS — R60.0 BILATERAL LOWER EXTREMITY EDEMA: ICD-10-CM

## 2023-04-23 NOTE — TELEPHONE ENCOUNTER
"Routing refill request to provider for review/approval because:  Labs out of range  BP not at goal    Last Written Prescription Date:  8/3/22  Last Fill Quantity: 90,  # refills: 2   Last office visit provider:  3/27/23     Requested Prescriptions   Pending Prescriptions Disp Refills     furosemide (LASIX) 20 MG tablet 90 tablet 2     Sig: Take 1 tablet (20 mg) by mouth daily       Diuretics (Including Combos) Protocol Failed - 4/22/2023 11:30 AM        Failed - Blood pressure under 140/90 in past 12 months     BP Readings from Last 3 Encounters:   03/27/23 (!) 142/82   09/26/22 (!) 150/78   04/04/22 125/80                 Failed - Normal serum creatinine on file in past 12 months     Recent Labs   Lab Test 03/27/23  0726   CR 1.18*              Passed - Recent (12 mo) or future (30 days) visit within the authorizing provider's specialty     Patient has had an office visit with the authorizing provider or a provider within the authorizing providers department within the previous 12 mos or has a future within next 30 days. See \"Patient Info\" tab in inbasket, or \"Choose Columns\" in Meds & Orders section of the refill encounter.              Passed - Medication is active on med list        Passed - Patient is age 18 or older        Passed - Normal serum potassium on file in past 12 months     Recent Labs   Lab Test 03/27/23  0726   POTASSIUM 5.0                    Passed - Normal serum sodium on file in past 12 months     Recent Labs   Lab Test 03/27/23  0726                      NERY CASTRO RN 04/23/23 11:24 AM  "

## 2023-04-24 RX ORDER — FUROSEMIDE 20 MG
20 TABLET ORAL DAILY
Qty: 90 TABLET | Refills: 2 | Status: SHIPPED | OUTPATIENT
Start: 2023-04-24 | End: 2023-12-15

## 2023-06-14 ENCOUNTER — TRANSFERRED RECORDS (OUTPATIENT)
Dept: HEALTH INFORMATION MANAGEMENT | Facility: CLINIC | Age: 88
End: 2023-06-14
Payer: MEDICARE

## 2023-06-19 ENCOUNTER — LAB (OUTPATIENT)
Dept: LAB | Facility: CLINIC | Age: 88
End: 2023-06-19
Payer: MEDICARE

## 2023-06-19 DIAGNOSIS — H53.2 DIPLOPIA: Primary | ICD-10-CM

## 2023-06-19 LAB — ERYTHROCYTE [SEDIMENTATION RATE] IN BLOOD BY WESTERGREN METHOD: 26 MM/HR (ref 0–20)

## 2023-06-19 PROCEDURE — 36415 COLL VENOUS BLD VENIPUNCTURE: CPT

## 2023-06-19 PROCEDURE — 86140 C-REACTIVE PROTEIN: CPT

## 2023-06-19 PROCEDURE — 85652 RBC SED RATE AUTOMATED: CPT

## 2023-06-20 LAB — CRP SERPL-MCNC: <3 MG/L

## 2023-07-03 ENCOUNTER — HOSPITAL ENCOUNTER (OUTPATIENT)
Dept: MRI IMAGING | Facility: HOSPITAL | Age: 88
Discharge: HOME | End: 2023-07-03
Attending: OPHTHALMOLOGY | Admitting: OPHTHALMOLOGY
Payer: MEDICARE

## 2023-07-03 DIAGNOSIS — H53.2 DIPLOPIA: ICD-10-CM

## 2023-07-03 PROCEDURE — 255N000002 HC RX 255 OP 636: Mod: JZ | Performed by: OPHTHALMOLOGY

## 2023-07-03 PROCEDURE — 70553 MRI BRAIN STEM W/O & W/DYE: CPT

## 2023-07-03 PROCEDURE — A9585 GADOBUTROL INJECTION: HCPCS | Mod: JZ | Performed by: OPHTHALMOLOGY

## 2023-07-03 RX ORDER — GADOBUTROL 604.72 MG/ML
0.1 INJECTION INTRAVENOUS ONCE
Status: COMPLETED | OUTPATIENT
Start: 2023-07-03 | End: 2023-07-03

## 2023-07-03 RX ADMIN — GADOBUTROL 10 ML: 604.72 INJECTION INTRAVENOUS at 08:01

## 2023-07-05 ENCOUNTER — TRANSFERRED RECORDS (OUTPATIENT)
Dept: HEALTH INFORMATION MANAGEMENT | Facility: CLINIC | Age: 88
End: 2023-07-05
Payer: MEDICARE

## 2023-07-18 ENCOUNTER — HOSPITAL ENCOUNTER (OUTPATIENT)
Dept: MRI IMAGING | Facility: HOSPITAL | Age: 88
Discharge: HOME | End: 2023-07-18
Attending: ORTHOPAEDIC SURGERY | Admitting: ORTHOPAEDIC SURGERY
Payer: MEDICARE

## 2023-07-18 DIAGNOSIS — S49.90XA SHOULDER INJURY: ICD-10-CM

## 2023-07-18 DIAGNOSIS — S29.011A PECTORALIS MUSCLE RUPTURE: ICD-10-CM

## 2023-07-18 PROCEDURE — 73221 MRI JOINT UPR EXTREM W/O DYE: CPT | Mod: LT

## 2023-07-24 ENCOUNTER — TRANSFERRED RECORDS (OUTPATIENT)
Dept: HEALTH INFORMATION MANAGEMENT | Facility: CLINIC | Age: 88
End: 2023-07-24
Payer: MEDICARE

## 2023-09-27 ASSESSMENT — ENCOUNTER SYMPTOMS
NAUSEA: 0
FEVER: 0
HEADACHES: 0
CONSTIPATION: 0
MYALGIAS: 0
NERVOUS/ANXIOUS: 0
PALPITATIONS: 0
COUGH: 0
CHILLS: 0
ARTHRALGIAS: 0
PARESTHESIAS: 0
WEAKNESS: 0
DYSURIA: 0
SORE THROAT: 0
HEMATOCHEZIA: 0
HEARTBURN: 0
JOINT SWELLING: 0
DIZZINESS: 0
HEMATURIA: 0
DIARRHEA: 0
FREQUENCY: 0
ABDOMINAL PAIN: 0
EYE PAIN: 0
SHORTNESS OF BREATH: 0

## 2023-09-27 ASSESSMENT — ACTIVITIES OF DAILY LIVING (ADL): CURRENT_FUNCTION: NO ASSISTANCE NEEDED

## 2023-10-02 ENCOUNTER — OFFICE VISIT (OUTPATIENT)
Dept: INTERNAL MEDICINE | Facility: CLINIC | Age: 88
End: 2023-10-02
Payer: MEDICARE

## 2023-10-02 VITALS
DIASTOLIC BLOOD PRESSURE: 76 MMHG | BODY MASS INDEX: 32.29 KG/M2 | OXYGEN SATURATION: 96 % | WEIGHT: 218 LBS | HEART RATE: 77 BPM | HEIGHT: 69 IN | SYSTOLIC BLOOD PRESSURE: 138 MMHG | TEMPERATURE: 98 F | RESPIRATION RATE: 15 BRPM

## 2023-10-02 DIAGNOSIS — I25.10 CORONARY ATHEROSCLEROSIS DUE TO CALCIFIED CORONARY LESION: ICD-10-CM

## 2023-10-02 DIAGNOSIS — Z85.828 HISTORY OF SQUAMOUS CELL CARCINOMA OF SKIN: ICD-10-CM

## 2023-10-02 DIAGNOSIS — C61 PROSTATE CANCER (H): ICD-10-CM

## 2023-10-02 DIAGNOSIS — Z00.00 ENCOUNTER FOR MEDICARE ANNUAL WELLNESS EXAM: Primary | ICD-10-CM

## 2023-10-02 DIAGNOSIS — I25.84 CORONARY ATHEROSCLEROSIS DUE TO CALCIFIED CORONARY LESION: ICD-10-CM

## 2023-10-02 DIAGNOSIS — Z51.81 ENCOUNTER FOR THERAPEUTIC DRUG MONITORING: ICD-10-CM

## 2023-10-02 LAB
ALBUMIN SERPL BCG-MCNC: 4.1 G/DL (ref 3.5–5.2)
ALP SERPL-CCNC: 81 U/L (ref 40–129)
ALT SERPL W P-5'-P-CCNC: 11 U/L (ref 0–70)
ANION GAP SERPL CALCULATED.3IONS-SCNC: 14 MMOL/L (ref 7–15)
AST SERPL W P-5'-P-CCNC: 26 U/L (ref 0–45)
BILIRUB SERPL-MCNC: 0.7 MG/DL
BUN SERPL-MCNC: 27.5 MG/DL (ref 8–23)
CALCIUM SERPL-MCNC: 9.4 MG/DL (ref 8.8–10.2)
CHLORIDE SERPL-SCNC: 103 MMOL/L (ref 98–107)
CHOLEST SERPL-MCNC: 114 MG/DL
CREAT SERPL-MCNC: 1.21 MG/DL (ref 0.67–1.17)
DEPRECATED HCO3 PLAS-SCNC: 22 MMOL/L (ref 22–29)
EGFRCR SERPLBLD CKD-EPI 2021: 57 ML/MIN/1.73M2
ERYTHROCYTE [DISTWIDTH] IN BLOOD BY AUTOMATED COUNT: 14.7 % (ref 10–15)
GLUCOSE SERPL-MCNC: 96 MG/DL (ref 70–99)
HCT VFR BLD AUTO: 37 % (ref 40–53)
HDLC SERPL-MCNC: 63 MG/DL
HGB BLD-MCNC: 12 G/DL (ref 13.3–17.7)
LDLC SERPL CALC-MCNC: 38 MG/DL
MCH RBC QN AUTO: 29.1 PG (ref 26.5–33)
MCHC RBC AUTO-ENTMCNC: 32.4 G/DL (ref 31.5–36.5)
MCV RBC AUTO: 90 FL (ref 78–100)
NONHDLC SERPL-MCNC: 51 MG/DL
PLATELET # BLD AUTO: 167 10E3/UL (ref 150–450)
POTASSIUM SERPL-SCNC: 4.3 MMOL/L (ref 3.4–5.3)
PROT SERPL-MCNC: 7.1 G/DL (ref 6.4–8.3)
RBC # BLD AUTO: 4.13 10E6/UL (ref 4.4–5.9)
SODIUM SERPL-SCNC: 139 MMOL/L (ref 135–145)
TRIGL SERPL-MCNC: 67 MG/DL
WBC # BLD AUTO: 4.9 10E3/UL (ref 4–11)

## 2023-10-02 PROCEDURE — 80053 COMPREHEN METABOLIC PANEL: CPT | Performed by: INTERNAL MEDICINE

## 2023-10-02 PROCEDURE — 36415 COLL VENOUS BLD VENIPUNCTURE: CPT | Performed by: INTERNAL MEDICINE

## 2023-10-02 PROCEDURE — G0439 PPPS, SUBSEQ VISIT: HCPCS | Performed by: INTERNAL MEDICINE

## 2023-10-02 PROCEDURE — 80061 LIPID PANEL: CPT | Performed by: INTERNAL MEDICINE

## 2023-10-02 PROCEDURE — 99213 OFFICE O/P EST LOW 20 MIN: CPT | Mod: 25 | Performed by: INTERNAL MEDICINE

## 2023-10-02 PROCEDURE — 85027 COMPLETE CBC AUTOMATED: CPT | Performed by: INTERNAL MEDICINE

## 2023-10-02 RX ORDER — NITROGLYCERIN 0.4 MG/1
TABLET SUBLINGUAL
Qty: 50 TABLET | Refills: 3 | Status: SHIPPED | OUTPATIENT
Start: 2023-10-02

## 2023-10-02 ASSESSMENT — ENCOUNTER SYMPTOMS
CONSTIPATION: 0
NAUSEA: 0
DYSURIA: 0
HEARTBURN: 0
NERVOUS/ANXIOUS: 0
DIZZINESS: 0
HEMATOCHEZIA: 0
FREQUENCY: 0
JOINT SWELLING: 0
SHORTNESS OF BREATH: 0
SORE THROAT: 0
WEAKNESS: 0
COUGH: 0
CHILLS: 0
HEMATURIA: 0
DIARRHEA: 0
FEVER: 0
MYALGIAS: 0
PALPITATIONS: 0
PARESTHESIAS: 0
EYE PAIN: 0
HEADACHES: 0
ARTHRALGIAS: 0
ABDOMINAL PAIN: 0

## 2023-10-02 ASSESSMENT — ACTIVITIES OF DAILY LIVING (ADL): CURRENT_FUNCTION: NO ASSISTANCE NEEDED

## 2023-10-02 NOTE — PROGRESS NOTES
SUBJECTIVE:   Ron is a 89 year old who presents for Preventive Visit.    Continues to live independently with wife.  He is planning a [place] cruise with the [employer] soon.  He goes to the exercise room at every day, does exercise bike.  He feels good with exercise.    No chest pain or chest pressure or palpitations.  His lower extremity edema is stable.    He does have some peripheral neuropathy but no foot sores and no falls.  Uses a walker.  Normal B12 level in March of this year.    He has kyphoscoliosis but does not feel he has sleep apnea and did not want evaluation.  In 2018 his heart echo showed some mild diastolic dysfunction and mild mitral regurgitation.  He has not had any progressive dyspnea on exertion.  Remains on Lasix 20 mg a day.  Blood pressure has been controlled.  No orthostasis.    He has not had any chest pain or chest pressure.  He had a non-Q wave MI in 2018 with 2 drug-eluting stents of the LAD, one of the circumflex and one of the PDA.  He is not use nitroglycerin but just asking for refill.    No epigastric pain or bleeding on aspirin daily.  On Lipitor 20 mg without generalized myalgia complaints.  An excellent cholesterol in March.    He did tear of supraspinatus tendon and biceps tendon in July bracing himself and about stool but he is not having significant pain and functioning adequately and did not proceed with any surgical correction.    No flare of his back pain.  History of laminectomy L2-5 in 2017.    Prostatectomy in 2014 for 3+3 cancer, had radiation for recurrence in 2020.  PSA level last fall was 0.3.  Sees urology this fall for yearly follow-up.  Urination is stable on Flomax.  Watchful waiting.    Squamous cell cancer.  No new skin lesion concerns.  Due for yearly follow-up this December.    Bowels are normal and no abdominal pain.  No blood in stool.  Last colonoscopy 2014 did show a polyp but not planning further colonoscopies with age.    Saw ophthalmology earlier this  "summer, no complaints.  No headache issues.    No swallowing problems.  No new cough.    No memory issues, or depression or anxiety issues.      10/2/2023     7:12 AM   Additional Questions   Roomed by Minal VIRGEN   Accompanied by soumya         10/2/2023     7:12 AM   Patient Reported Additional Medications   Patient reports taking the following new medications no       Are you in the first 12 months of your Medicare coverage?  No    Healthy Habits:     In general, how would you rate your overall health?  Good    Frequency of exercise:  4-5 days/week    Duration of exercise:  30-45 minutes    Do you usually eat at least 4 servings of fruit and vegetables a day, include whole grains    & fiber and avoid regularly eating high fat or \"junk\" foods?  Yes    Taking medications regularly:  Yes    Medication side effects:  None    Ability to successfully perform activities of daily living:  No assistance needed    Home Safety:  No safety concerns identified    Hearing Impairment:  No hearing concerns    In the past 6 months, have you been bothered by leaking of urine?  No    In general, how would you rate your overall mental or emotional health?  Excellent    Additional concerns today:  No      Today's PHQ-2 Score:       10/2/2023     7:00 AM   PHQ-2 ( 1999 Pfizer)   Q1: Little interest or pleasure in doing things 0   Q2: Feeling down, depressed or hopeless 0   PHQ-2 Score 0   Q1: Little interest or pleasure in doing things Not at all   Q2: Feeling down, depressed or hopeless Not at all   PHQ-2 Score 0           Have you ever done Advance Care Planning? (For example, a Health Directive, POLST, or a discussion with a medical provider or your loved ones about your wishes): Yes, patient states has an Advance Care Planning document and will bring a copy to the clinic.       Fall risk  Fallen 2 or more times in the past year?: No  Any fall with injury in the past year?: No    Cognitive Screening   1) Repeat 3 items (Leader, Season, " Table)    2) Clock draw: NORMAL  3) 3 item recall: Recalls 3 objects  Results: 3 items recalled: COGNITIVE IMPAIRMENT LESS LIKELY    Mini-CogTM Copyright TAMARA Pak. Licensed by the author for use in [facility]; reprinted with permission (edward@.[facility]). All rights reserved.      Do you have sleep apnea, excessive snoring or daytime drowsiness? : no    Reviewed and updated as needed this visit by clinical staff   Tobacco  Allergies  Meds              Reviewed and updated as needed this visit by Provider                 Social History     Tobacco Use    Smoking status: Former    Smokeless tobacco: Never   Substance Use Topics    Alcohol use: Not on file             9/27/2023     8:15 PM   Alcohol Use   Prescreen: >3 drinks/day or >7 drinks/week? Not Applicable     Do you have a current opioid prescription? No  Do you use any other controlled substances or medications that are not prescribed by a provider? None              Current providers sharing in care for this patient include:   Patient Care Team:  Alejandro Redd MD as PCP - General (Internal Medicine)  Alejandro Redd MD as Assigned PCP    The following health maintenance items are reviewed in Epic and correct as of today:  Health Maintenance   Topic Date Due    ANNUAL REVIEW OF HM ORDERS  04/04/2023    INFLUENZA VACCINE (1) 09/01/2023    MEDICARE ANNUAL WELLNESS VISIT  09/26/2023    FALL RISK ASSESSMENT  10/02/2024    ADVANCE CARE PLANNING  10/02/2028    DTAP/TDAP/TD IMMUNIZATION (3 - Td or Tdap) 02/13/2033    PHQ-2 (once per calendar year)  Completed    Pneumococcal Vaccine: 65+ Years  Completed    ZOSTER IMMUNIZATION  Completed    COVID-19 Vaccine  Completed    IPV IMMUNIZATION  Aged Out    HPV IMMUNIZATION  Aged Out    MENINGITIS IMMUNIZATION  Aged Out     Current Outpatient Medications   Medication Sig Dispense Refill    ascorbic acid, vitamin C, (ASCORBIC ACID WITH HERMILA HIPS) 500 MG tablet [ASCORBIC ACID, VITAMIN C, (ASCORBIC ACID WITH HERMILA  HIPS) 500 MG TABLET] Take 500 mg by mouth daily.      aspirin 81 MG EC tablet [ASPIRIN 81 MG EC TABLET] Take 81 mg by mouth every other day.             cholecalciferol, vitamin D3, (VITAMIN D3) 1,000 unit capsule [CHOLECALCIFEROL, VITAMIN D3, (VITAMIN D3) 1,000 UNIT CAPSULE] Take 1,000 Units by mouth daily.             fish oil-dha-epa 1,200-144-216 mg cap [FISH OIL-DHA-EPA 1,200-144-216 MG CAP] Take 1,000 mg by mouth daily .            fluticasone (FLONASE) 50 MCG/ACT nasal spray USE 1 SPRAY NASALLY TWO  TIMES DAILY AS NEEDED FOR  HAY FEVER 48 g 11    FOLIC ACID/MULTIVITS-MIN/LUT (CENTRUM SILVER ORAL) [FOLIC ACID/MULTIVITS-MIN/LUT (CENTRUM SILVER ORAL)] Take 1 tablet by mouth daily.      furosemide (LASIX) 20 MG tablet Take 1 tablet (20 mg) by mouth daily 90 tablet 2    LIPITOR 20 MG tablet Take 1 tablet (20 mg) by mouth daily 90 tablet 3    nitroGLYcerin (NITROSTAT) 0.4 MG sublingual tablet [NITROGLYCERIN (NITROSTAT) 0.4 MG SL TABLET] DISSOLVE 1 TABLET UNDER THE TONGUE EVERY 5 MINUTES AS  NEEDED FOR CHEST PAIN. MAX  OF 3 TABLETS IN 15 MINUTES. CALL 911 IF PAIN PERSISTS. 50 tablet 3    tamsulosin (FLOMAX) 0.4 MG capsule [TAMSULOSIN (FLOMAX) 0.4 MG CAP] TAKE 1 CAPSULE BY MOUTH AT  BEDTIME 90 capsule 3    zinc 50 mg Tab [ZINC 50 MG TAB] Take 1 tablet by mouth daily.       Allergies   Allergen Reactions    Nuts - Unspecified [Nuts] Itching     Other reaction(s): Tongue Swelling, Brazil nuts             Review of Systems   Constitutional:  Negative for chills and fever.   HENT:  Positive for hearing loss. Negative for congestion, ear pain and sore throat.    Eyes:  Negative for pain and visual disturbance.   Respiratory:  Negative for cough and shortness of breath.    Cardiovascular:  Negative for chest pain, palpitations and peripheral edema.   Gastrointestinal:  Negative for abdominal pain, constipation, diarrhea, heartburn, hematochezia and nausea.   Genitourinary:  Negative for dysuria, frequency, genital sores,  "hematuria, impotence, penile discharge and urgency.   Musculoskeletal:  Negative for arthralgias, joint swelling and myalgias.   Skin:  Negative for rash.   Neurological:  Negative for dizziness, weakness, headaches and paresthesias.   Psychiatric/Behavioral:  Negative for mood changes. The patient is not nervous/anxious.      Hearing loss is stable    OBJECTIVE:   /76   Pulse 77   Temp 98  F (36.7  C)   Resp 15   Ht 1.753 m (5' 9\")   Wt 98.9 kg (218 lb)   SpO2 96%   BMI 32.19 kg/m   Estimated body mass index is 32.19 kg/m  as calculated from the following:    Height as of this encounter: 1.753 m (5' 9\").    Weight as of this encounter: 98.9 kg (218 lb).  Physical Exam  Healthy-appearing male.  Alert and oriented x3.  Normal cognition.  Mild unsteady gait consistent with neuropathy but was able to climb up on exam table.  Nonfocal neurologic exam.  Not parkinsonian.  Normal mood and affect.  Clock face drawing and word recall normal.  Pupils and irises equal and reactive.  Extraocular muscles intact.  He has hearing aids, moderate cerumen but not obstructing.  Pharynx appears normal.  Teeth in good condition.  No cervical or supraclavicular or axillary adenopathy.  No JVD and no carotid bruits.  No thyromegaly or nodularity.  Lungs clear to auscultation with good respiratory excursion.  Heart is regular with no murmur rub or gallop.  +1 ankle edema bilaterally which is chronic.  Abdomen is nontender nonobese no hepatosplenomegaly and no pulsatile abdominal mass.  Skin exam shows multiple seborrheic keratoses but no suspicious skin lesions noted.    ASSESSMENT / PLAN:   (Z00.00) Encounter for Medicare annual wellness exam  (primary encounter diagnosis)  Comment: Main focus for patient's health maintenance is maintaining mobility which she is doing well at with a daily exercise routine.  He has peripheral neuropathy of lower extremity edema but that is stable.    He has not wanted further sleep apnea " "evaluation.    Cognitively he is doing well.  He has not had any recurrence of his heart symptoms.  Functioning adequately despite left shoulder tearing.    Plan is to continue with regular exercise and current treatment plan.  Follow-up in 6 months for a routine checkup on his blood pressure and coronary disease.  He is going to have a flu shot later in the season.  He already had his COVID shot.    Patient instructions:  No change in treatment plan.  Continue your daily exercise routine.    Flu shot later this fall.    See dermatology this fall, as planned, but no new suspicious skin lesions noted today.    Follow-up with oncology in December to monitor your prostate.    See me in the spring for routine checkup.      (I25.10,  I25.84) Coronary atherosclerosis due to calcified coronary lesion  Comment: Asymptomatic.  Good exercise tolerance.  Continue aspirin and atorvastatin  Plan: nitroGLYcerin (NITROSTAT) 0.4 MG sublingual         tablet, Lipid Profile            (C61) Prostate cancer (H)  Comment: Watchful waiting, follow-up with oncology in December.  Urination stable on Flomax  Plan:     (Z85.828) History of squamous cell carcinoma of skin  Comment: No evidence of recurrence.  Yearly follow-up with dermatology this fall  Plan:     (Z51.81) Encounter for therapeutic drug monitoring  Comment:   Plan: CBC with platelets, Comprehensive metabolic         panel            Patient has been advised of split billing requirements and indicates understanding: Yes      COUNSELING:  Reviewed preventive health counseling, as reflected in patient instructions       Regular exercise       Healthy diet/nutrition      BMI:   Estimated body mass index is 32.19 kg/m  as calculated from the following:    Height as of this encounter: 1.753 m (5' 9\").    Weight as of this encounter: 98.9 kg (218 lb).         He reports that he has quit smoking. He has never used smokeless tobacco.      Appropriate preventive services were discussed " with this patient, including applicable screening as appropriate for fall prevention, nutrition, physical activity, Tobacco-use cessation, weight loss and cognition.  Checklist reviewing preventive services available has been given to the patient.    Reviewed patients plan of care and provided an AVS. The Basic Care Plan (routine screening as documented in Health Maintenance) for Scott meets the Care Plan requirement. This Care Plan has been established and reviewed with the Patient.          Alejandro Redd MD  [facility]    Identified Health Risks:  I have reviewed Opioid Use Disorder and Substance Use Disorder risk factors and made any needed referrals.

## 2023-10-02 NOTE — PATIENT INSTRUCTIONS
No change in treatment plan.  Continue your daily exercise routine.    Flu shot later this fall.    See dermatology this fall, as planned, but no new suspicious skin lesions noted today.    Follow-up with oncology in December to monitor your prostate.    See me in the spring for routine checkup.

## 2023-12-01 ENCOUNTER — TRANSFERRED RECORDS (OUTPATIENT)
Dept: HEALTH INFORMATION MANAGEMENT | Facility: CLINIC | Age: 88
End: 2023-12-01
Payer: MEDICARE

## 2023-12-15 DIAGNOSIS — R60.0 BILATERAL LOWER EXTREMITY EDEMA: ICD-10-CM

## 2023-12-15 RX ORDER — FUROSEMIDE 20 MG
20 TABLET ORAL DAILY
Qty: 90 TABLET | Refills: 2 | Status: SHIPPED | OUTPATIENT
Start: 2023-12-15 | End: 2024-08-21

## 2024-01-17 DIAGNOSIS — C61 PROSTATE CANCER (H): ICD-10-CM

## 2024-01-17 RX ORDER — TAMSULOSIN HYDROCHLORIDE 0.4 MG/1
CAPSULE ORAL
Qty: 90 CAPSULE | Refills: 2 | Status: SHIPPED | OUTPATIENT
Start: 2024-01-17

## 2024-03-01 ENCOUNTER — TELEPHONE (OUTPATIENT)
Dept: AUDIOLOGY | Facility: CLINIC | Age: 89
End: 2024-03-01
Payer: MEDICARE

## 2024-03-01 NOTE — TELEPHONE ENCOUNTER
One of patient's hearing aids is feeding back when a hand gets close to the device in situ; other device does not do this. Feedback does not occur at other times. Described causes of/reasons for feedback. Patient indicated he will be seeing primary care within the month; will request otoscopy and ear cleaning, if indicated. Patient is also overdue for hearing testing (last audiogram was dated 7-6-21); he was encouraged to request orders for hearing evaluation from primary care and schedule a hearing evaluation/hearing aid check appointment to ensure programming is up to date and devices are functional. Mr. Cherry expressed verbal understanding of this information and plan.    Mary Madrid, [facility]  [place] Licensed Audiologist [id number]      [facility] Call Center    Phone Message    May a detailed message be left on voicemail: yes     Reason for Call: Other: Pt would like a call to discuss problems he is having with his hearing aids. Please call to advise. Thanks.      Action Taken: Other: AUDIO    Travel Screening: Not Applicable

## 2024-04-03 ENCOUNTER — OFFICE VISIT (OUTPATIENT)
Dept: INTERNAL MEDICINE | Facility: CLINIC | Age: 89
End: 2024-04-03
Payer: MEDICARE

## 2024-04-03 VITALS
RESPIRATION RATE: 16 BRPM | TEMPERATURE: 97.8 F | BODY MASS INDEX: 32.58 KG/M2 | DIASTOLIC BLOOD PRESSURE: 80 MMHG | SYSTOLIC BLOOD PRESSURE: 138 MMHG | HEART RATE: 82 BPM | OXYGEN SATURATION: 98 % | WEIGHT: 220 LBS | HEIGHT: 69 IN

## 2024-04-03 DIAGNOSIS — Z51.81 ENCOUNTER FOR THERAPEUTIC DRUG MONITORING: ICD-10-CM

## 2024-04-03 DIAGNOSIS — E78.00 HYPERCHOLESTEROLEMIA: ICD-10-CM

## 2024-04-03 DIAGNOSIS — I25.10 CORONARY ARTERY DISEASE DUE TO CALCIFIED CORONARY LESION: Primary | ICD-10-CM

## 2024-04-03 DIAGNOSIS — I25.84 CORONARY ARTERY DISEASE DUE TO CALCIFIED CORONARY LESION: Primary | ICD-10-CM

## 2024-04-03 DIAGNOSIS — R60.0 BILATERAL LOWER EXTREMITY EDEMA: ICD-10-CM

## 2024-04-03 DIAGNOSIS — Z85.46 HISTORY OF PROSTATE CANCER: ICD-10-CM

## 2024-04-03 LAB
ALBUMIN SERPL BCG-MCNC: 4.2 G/DL (ref 3.5–5.2)
ALP SERPL-CCNC: 93 U/L (ref 40–150)
ALT SERPL W P-5'-P-CCNC: 14 U/L (ref 0–70)
ANION GAP SERPL CALCULATED.3IONS-SCNC: 9 MMOL/L (ref 7–15)
AST SERPL W P-5'-P-CCNC: 25 U/L (ref 0–45)
BILIRUB SERPL-MCNC: 0.6 MG/DL
BUN SERPL-MCNC: 21.8 MG/DL (ref 8–23)
CALCIUM SERPL-MCNC: 10 MG/DL (ref 8.2–9.6)
CHLORIDE SERPL-SCNC: 104 MMOL/L (ref 98–107)
CREAT SERPL-MCNC: 1.18 MG/DL (ref 0.67–1.17)
DEPRECATED HCO3 PLAS-SCNC: 26 MMOL/L (ref 22–29)
EGFRCR SERPLBLD CKD-EPI 2021: 59 ML/MIN/1.73M2
GLUCOSE SERPL-MCNC: 106 MG/DL (ref 70–99)
POTASSIUM SERPL-SCNC: 4.8 MMOL/L (ref 3.4–5.3)
PROT SERPL-MCNC: 7.3 G/DL (ref 6.4–8.3)
SODIUM SERPL-SCNC: 139 MMOL/L (ref 135–145)

## 2024-04-03 PROCEDURE — 36415 COLL VENOUS BLD VENIPUNCTURE: CPT | Performed by: INTERNAL MEDICINE

## 2024-04-03 PROCEDURE — 80053 COMPREHEN METABOLIC PANEL: CPT | Performed by: INTERNAL MEDICINE

## 2024-04-03 PROCEDURE — G2211 COMPLEX E/M VISIT ADD ON: HCPCS | Performed by: INTERNAL MEDICINE

## 2024-04-03 PROCEDURE — 99214 OFFICE O/P EST MOD 30 MIN: CPT | Performed by: INTERNAL MEDICINE

## 2024-04-03 RX ORDER — ATORVASTATIN CALCIUM 20 MG
20 TABLET ORAL DAILY
Qty: 90 TABLET | Refills: 3 | Status: SHIPPED | OUTPATIENT
Start: 2024-04-03

## 2024-04-03 NOTE — PROGRESS NOTES
Scott Cherry   89 year old male    Date of Visit: 4/3/2024    Chief Complaint   Patient presents with    Follow Up     6 mo follow up, fasting.      Subjective  89-year-old male is still living independently with wife.  He enjoyed the [place] cruise with grandchildren.  He did slip and fall in [place] bathroom in the airport, with head impact injury but he had a CT scan there that was negative.    He has severe unsteady gait with peripheral neuropathy and kyphosis.  No new foot sores.  Normal B12 level 1 year ago.    Still doing exercise bike 30 minutes a day and walking always with walker.  No new muscle skeletal pain.    No worsening shortness of breath, denies significant shortness of breath.    Suspected sleep apnea but has not wanted evaluation.  2018 heart echo showed mild diastolic dysfunction and mild mitral regurgitation.    He takes Lasix 20 mg on most days, except when he is traveling.    Blood pressure has been controlled in the 130s/70s.    Just saw cardiology last August with blood pressure 138/80.    No chest pain or chest pressure.  Non-Q wave MI in 2018.  He has 2 drug-eluting stents to the LAD, drug-eluting stent to the circumflex and BRITTANEY.  Continues on atorvastatin 20 mg, and aspirin daily.  No epigastric pain or bleeding.    Still continue to function with history of supraspinatus and biceps tear July of last year.  Laminectomy L2-5 in 2017 but no complaints of back pain at this time.    No recent cough or respiratory illness.    Mild borderline chronic renal insufficiency with mild elevated creatinine 1.2 last October.    Prostatectomy 3+3 in 2014 with recurrence and radiation treatment in 2020.  Saw a radiation oncology last December with now 1 year follow-up plan.  PSA in November was 0.2.    History of squamous cell cancer with recurrence last fall, follow-up again in June.        PMHx:    Past Medical History:   Diagnosis Date    Adenomatous colon polyp     in 2014, 5 year follow up     "BPH (benign prostatic hyperplasia)     on flomax    CAD (coronary artery disease)     BMS LAD '01/ neg stress test '07/ no h/o MI    COPD (chronic obstructive pulmonary disease) (H)     mild/ former smoker    History of basal cell carcinoma of skin     yearly exam in Sept.    HTN (hypertension)     Hypercholesteremia     controlled on lipitor    Prostate cancer (H)     heriberto 3+3 on 4/14 bx.  No Tx yet, watchful waiting.     Spinal stenosis     severe L3-4, mod L4-5     PSHx:  No past surgical history on file.  Immunizations:   Immunization History   Administered Date(s) Administered    COVID-19 12+ (2023-24) (Pfizer) 09/24/2023    COVID-19 Bivalent 12+ (Pfizer) 09/30/2022    COVID-19 MONOVALENT 12+ (Pfizer) 02/19/2021, 03/12/2021, 09/29/2021    COVID-19 Monovalent 12+ (Pfizer 2022) 04/11/2022    Flu 65+ Years 10/15/2019    Flu, Unspecified 09/30/2009, 10/07/2010    Influenza (High Dose) 3 valent vaccine 09/25/2017, 10/24/2018    Influenza Vaccine 65+ (FLUAD) 11/02/2021    Influenza Vaccine 65+ (Fluzone HD) 10/18/2020, 10/17/2022, 11/12/2023    Pneumo Conj 13-V (2010&after) 04/27/2015    Pneumococcal 23 valent 01/01/2004, 06/23/2010    RSV Vaccine (Arexvy) 10/30/2023    TDAP (Adacel,Boostrix) 07/25/2012, 02/13/2023    Td,adult,historic,unspecified 03/05/2008    Zoster recombinant adjuvanted (SHINGRIX) 09/19/2019, 12/06/2019    Zoster vaccine, live 01/21/2009       ROS A comprehensive review of systems was performed and was otherwise negative    Medications, allergies, and problem list were reviewed and updated    Exam  /80   Pulse 82   Temp 97.8  F (36.6  C)   Resp 16   Ht 1.753 m (5' 9\")   Wt 99.8 kg (220 lb)   SpO2 98%   BMI 32.49 kg/m    Alert and oriented x 3.  Good mental status.  Moderately severe kyphosis and moderate unsteady gait but at baseline.  Lungs are clear to auscultation.  Heart is regular without murmur.  Abdomen is nontender.  There is trace ankle edema " bilaterally    Assessment/Plan  1. Coronary artery disease due to calcified coronary lesion  Asymptomatic.  Good exercise tolerance with daily exercise bike.  Continue aspirin and atorvastatin, no evidence of toxicity at this time    2. Bilateral lower extremity edema  Trace, controlled.  Associated with diastolic dysfunction.  Moderate suspicion for sleep apnea but has not wanted evaluation.  Avoid weight gain.  Continue Lasix 20 mg on most days.  Follow-up if worsening symptoms.    3. Hypercholesterolemia  Continue current atorvastatin.  - LIPITOR 20 MG tablet; Take 1 tablet (20 mg) by mouth daily  Dispense: 90 tablet; Refill: 3    4. History of prostate cancer  Recurrence after surgery and radiation.  Watchful waiting at this time with radiation oncology recheck in the fall.    5. Encounter for therapeutic drug monitoring    - Comprehensive metabolic panel    History of squamous cell cancer of the skin, follow-up in June with dermatology.    The longitudinal plan of care for the diagnosis(es)/condition(s) as documented were addressed during this visit. Due to the added complexity in care, I will continue to support Ron in the subsequent management and with ongoing continuity of care.        Return in about 6 months (around 10/3/2024) for Adult wellness visit physical exam.   Patient Instructions   No change in treatment plan.    Continue your daily exercise.    Contact me if you have significantly more leg swelling or more short of breath.    Follow-up after October 2 for your adult wellness visit physical exam.    Alejandro Redd MD, MD        Current Outpatient Medications   Medication Sig Dispense Refill    ascorbic acid, vitamin C, (ASCORBIC ACID WITH HERMILA HIPS) 500 MG tablet [ASCORBIC ACID, VITAMIN C, (ASCORBIC ACID WITH HERMILA HIPS) 500 MG TABLET] Take 500 mg by mouth daily.      aspirin 81 MG EC tablet [ASPIRIN 81 MG EC TABLET] Take 81 mg by mouth every other day.             cholecalciferol, vitamin D3,  (VITAMIN D3) 1,000 unit capsule [CHOLECALCIFEROL, VITAMIN D3, (VITAMIN D3) 1,000 UNIT CAPSULE] Take 1,000 Units by mouth daily.             fish oil-dha-epa 1,200-144-216 mg cap [FISH OIL-DHA-EPA 1,200-144-216 MG CAP] Take 1,000 mg by mouth daily .            fluticasone (FLONASE) 50 MCG/ACT nasal spray USE 1 SPRAY NASALLY TWO  TIMES DAILY AS NEEDED FOR  HAY FEVER 48 g 11    FOLIC ACID/MULTIVITS-MIN/LUT (CENTRUM SILVER ORAL) [FOLIC ACID/MULTIVITS-MIN/LUT (CENTRUM SILVER ORAL)] Take 1 tablet by mouth daily.      furosemide (LASIX) 20 MG tablet Take 1 tablet (20 mg) by mouth daily 90 tablet 2    LIPITOR 20 MG tablet Take 1 tablet (20 mg) by mouth daily 90 tablet 3    nitroGLYcerin (NITROSTAT) 0.4 MG sublingual tablet [NITROGLYCERIN (NITROSTAT) 0.4 MG SL TABLET] DISSOLVE 1 TABLET UNDER THE TONGUE EVERY 5 MINUTES AS  NEEDED FOR CHEST PAIN. MAX  OF 3 TABLETS IN 15 MINUTES. CALL 911 IF PAIN PERSISTS. 50 tablet 3    tamsulosin (FLOMAX) 0.4 MG capsule [TAMSULOSIN (FLOMAX) 0.4 MG CAP] TAKE 1 CAPSULE BY MOUTH AT  BEDTIME 90 capsule 2    zinc 50 mg Tab [ZINC 50 MG TAB] Take 1 tablet by mouth daily.       Allergies   Allergen Reactions    Nuts - Unspecified [Nuts] Itching     Other reaction(s): Tongue Swelling, Brazil nuts    Brazil Nut (Berthollefia Excelsa) Hives     Social History     Tobacco Use    Smoking status: Former     Passive exposure: Past    Smokeless tobacco: Never   Vaping Use    Vaping Use: Never used             Abbi Velasquez is a 89 year old, presenting for the following health issues:  Follow Up (6 mo follow up, fasting. )      4/3/2024     7:54 AM   Additional Questions   Roomed by Minal VIRGEN   Accompanied by soumya     History of Present Illness       Reason for visit:  Routine 6 month checkup    He eats 2-3 servings of fruits and vegetables daily.He consumes 0 sweetened beverage(s) daily.He exercises with enough effort to increase his heart rate 30 to 60 minutes per day.  He exercises with enough effort to  increase his heart rate 6 days per week.   He is taking medications regularly.                     Objective    There were no vitals taken for this visit.  There is no height or weight on file to calculate BMI.  Physical Exam               Signed Electronically by: Alejandro Redd MD

## 2024-04-03 NOTE — PATIENT INSTRUCTIONS
No change in treatment plan.    Continue your daily exercise.    Contact me if you have significantly more leg swelling or more short of breath.    Follow-up after October 2 for your adult wellness visit physical exam.

## 2024-04-11 ENCOUNTER — DOCUMENTATION ONLY (OUTPATIENT)
Dept: OTHER | Facility: CLINIC | Age: 89
End: 2024-04-11
Payer: MEDICARE

## 2024-07-25 ENCOUNTER — MYC MEDICAL ADVICE (OUTPATIENT)
Dept: INTERNAL MEDICINE | Facility: CLINIC | Age: 89
End: 2024-07-25
Payer: COMMERCIAL

## 2024-07-25 DIAGNOSIS — J30.1 SEASONAL ALLERGIC RHINITIS DUE TO POLLEN: Primary | ICD-10-CM

## 2024-07-25 DIAGNOSIS — J30.9 ALLERGIC RHINITIS: ICD-10-CM

## 2024-07-25 RX ORDER — FLUTICASONE PROPIONATE 50 MCG
1 SPRAY, SUSPENSION (ML) NASAL PRN
Qty: 48 G | Refills: 11 | Status: SHIPPED | OUTPATIENT
Start: 2024-07-25 | End: 2024-08-07

## 2024-08-07 DIAGNOSIS — J30.1 SEASONAL ALLERGIC RHINITIS DUE TO POLLEN: ICD-10-CM

## 2024-08-07 RX ORDER — FLUTICASONE PROPIONATE 50 MCG
1 SPRAY, SUSPENSION (ML) NASAL PRN
Qty: 48 G | Refills: 10 | Status: SHIPPED | OUTPATIENT
Start: 2024-08-07

## 2024-08-20 DIAGNOSIS — R60.0 BILATERAL LOWER EXTREMITY EDEMA: ICD-10-CM

## 2024-08-21 RX ORDER — FUROSEMIDE 20 MG
20 TABLET ORAL DAILY
Qty: 90 TABLET | Refills: 3 | Status: SHIPPED | OUTPATIENT
Start: 2024-08-21

## 2024-10-08 SDOH — HEALTH STABILITY: PHYSICAL HEALTH: ON AVERAGE, HOW MANY MINUTES DO YOU ENGAGE IN EXERCISE AT THIS LEVEL?: 30 MIN

## 2024-10-08 SDOH — HEALTH STABILITY: PHYSICAL HEALTH: ON AVERAGE, HOW MANY DAYS PER WEEK DO YOU ENGAGE IN MODERATE TO STRENUOUS EXERCISE (LIKE A BRISK WALK)?: 4 DAYS

## 2024-10-08 ASSESSMENT — SOCIAL DETERMINANTS OF HEALTH (SDOH): HOW OFTEN DO YOU GET TOGETHER WITH FRIENDS OR RELATIVES?: ONCE A WEEK

## 2024-10-09 ENCOUNTER — OFFICE VISIT (OUTPATIENT)
Dept: INTERNAL MEDICINE | Facility: CLINIC | Age: 89
End: 2024-10-09
Payer: MEDICARE

## 2024-10-09 VITALS
WEIGHT: 219 LBS | HEART RATE: 96 BPM | HEIGHT: 69 IN | DIASTOLIC BLOOD PRESSURE: 76 MMHG | SYSTOLIC BLOOD PRESSURE: 132 MMHG | TEMPERATURE: 97.8 F | RESPIRATION RATE: 16 BRPM | BODY MASS INDEX: 32.44 KG/M2 | OXYGEN SATURATION: 98 %

## 2024-10-09 DIAGNOSIS — Z51.81 ENCOUNTER FOR THERAPEUTIC DRUG MONITORING: ICD-10-CM

## 2024-10-09 DIAGNOSIS — Z85.828 HISTORY OF SQUAMOUS CELL CARCINOMA OF SKIN: ICD-10-CM

## 2024-10-09 DIAGNOSIS — Z00.00 ENCOUNTER FOR WELLNESS EXAMINATION IN ADULT: Primary | ICD-10-CM

## 2024-10-09 DIAGNOSIS — C61 PROSTATE CANCER (H): ICD-10-CM

## 2024-10-09 DIAGNOSIS — I25.84 CORONARY ARTERY DISEASE DUE TO CALCIFIED CORONARY LESION: ICD-10-CM

## 2024-10-09 DIAGNOSIS — R73.9 HYPERGLYCEMIA: ICD-10-CM

## 2024-10-09 DIAGNOSIS — I25.10 CORONARY ARTERY DISEASE DUE TO CALCIFIED CORONARY LESION: ICD-10-CM

## 2024-10-09 DIAGNOSIS — J30.1 SEASONAL ALLERGIC RHINITIS DUE TO POLLEN: ICD-10-CM

## 2024-10-09 LAB
ALBUMIN SERPL BCG-MCNC: 4.1 G/DL (ref 3.5–5.2)
ALP SERPL-CCNC: 97 U/L (ref 40–150)
ALT SERPL W P-5'-P-CCNC: 15 U/L (ref 0–70)
ANION GAP SERPL CALCULATED.3IONS-SCNC: 11 MMOL/L (ref 7–15)
AST SERPL W P-5'-P-CCNC: 27 U/L (ref 0–45)
BILIRUB SERPL-MCNC: 0.6 MG/DL
BUN SERPL-MCNC: 25.5 MG/DL (ref 8–23)
CALCIUM SERPL-MCNC: 9.7 MG/DL (ref 8.8–10.4)
CHLORIDE SERPL-SCNC: 102 MMOL/L (ref 98–107)
CHOLEST SERPL-MCNC: 122 MG/DL
CREAT SERPL-MCNC: 1.29 MG/DL (ref 0.67–1.17)
EGFRCR SERPLBLD CKD-EPI 2021: 53 ML/MIN/1.73M2
ERYTHROCYTE [DISTWIDTH] IN BLOOD BY AUTOMATED COUNT: 14.3 % (ref 10–15)
EST. AVERAGE GLUCOSE BLD GHB EST-MCNC: 120 MG/DL
FASTING STATUS PATIENT QL REPORTED: YES
FASTING STATUS PATIENT QL REPORTED: YES
GLUCOSE SERPL-MCNC: 119 MG/DL (ref 70–99)
HBA1C MFR BLD: 5.8 % (ref 0–5.6)
HCO3 SERPL-SCNC: 26 MMOL/L (ref 22–29)
HCT VFR BLD AUTO: 39.8 % (ref 40–53)
HDLC SERPL-MCNC: 65 MG/DL
HGB BLD-MCNC: 12.9 G/DL (ref 13.3–17.7)
LDLC SERPL CALC-MCNC: 42 MG/DL
MCH RBC QN AUTO: 29.1 PG (ref 26.5–33)
MCHC RBC AUTO-ENTMCNC: 32.4 G/DL (ref 31.5–36.5)
MCV RBC AUTO: 90 FL (ref 78–100)
NONHDLC SERPL-MCNC: 57 MG/DL
PLATELET # BLD AUTO: 171 10E3/UL (ref 150–450)
POTASSIUM SERPL-SCNC: 4.8 MMOL/L (ref 3.4–5.3)
PROT SERPL-MCNC: 7.2 G/DL (ref 6.4–8.3)
RBC # BLD AUTO: 4.43 10E6/UL (ref 4.4–5.9)
SODIUM SERPL-SCNC: 139 MMOL/L (ref 135–145)
TRIGL SERPL-MCNC: 74 MG/DL
WBC # BLD AUTO: 5.6 10E3/UL (ref 4–11)

## 2024-10-09 PROCEDURE — G0439 PPPS, SUBSEQ VISIT: HCPCS | Performed by: INTERNAL MEDICINE

## 2024-10-09 PROCEDURE — 36415 COLL VENOUS BLD VENIPUNCTURE: CPT | Performed by: INTERNAL MEDICINE

## 2024-10-09 PROCEDURE — 80053 COMPREHEN METABOLIC PANEL: CPT | Performed by: INTERNAL MEDICINE

## 2024-10-09 PROCEDURE — 83036 HEMOGLOBIN GLYCOSYLATED A1C: CPT | Performed by: INTERNAL MEDICINE

## 2024-10-09 PROCEDURE — 85027 COMPLETE CBC AUTOMATED: CPT | Performed by: INTERNAL MEDICINE

## 2024-10-09 PROCEDURE — 80061 LIPID PANEL: CPT | Performed by: INTERNAL MEDICINE

## 2024-10-09 RX ORDER — FLUTICASONE PROPIONATE 50 MCG
1 SPRAY, SUSPENSION (ML) NASAL PRN
Qty: 48 G | Refills: 3 | Status: SHIPPED | OUTPATIENT
Start: 2024-10-09 | End: 2024-10-10

## 2024-10-09 NOTE — PATIENT INSTRUCTIONS
No change in treatment plan.    Continue to remain active on a daily basis.  Regular activity helps keep your body strong and healthy.    If you have any worsening shortness of breath or chest pain, get evaluated right away.    Follow-up with me in 1 year for adult wellness visit physical exam or as needed for issues.    He will have your prostate followed up in November as you stated.    See dermatology in approximately 6 months as planned for routine checkup.    Continue to see ophthalmology yearly in the summer for eye exam.

## 2024-10-09 NOTE — PROGRESS NOTES
Preventive Care Visit  [facility]  Alejandro Redd MD, Internal Medicine  Oct 9, 2024          Scott Cherry   89 year old male    Date of Visit: 10/9/2024    Chief Complaint   Patient presents with    Medicare Visit     Fasting.     Subjective  89-year-old male who lives independently with wife.  Enjoys spending time with grandkids.  Does not exercise bike for half hour every day and walks and stays active.    He does walk with a walker.  No falls.  He does have kyphosis and suspected sleep apnea but he has not wanted evaluation.  Peripheral neuropathy stable and no foot sores.  Normal B12 level last year.    No worsening shortness of breath and lower extremity edema is stable.  2018 echo showed mild diastolic dysfunction and mild mitral regurgitation.  Does continue on Lasix 20 mg daily to help control his edema.    But he denies any worsening shortness of breath with activity or worsening fatigue.    No chest pain or chest pressure.  Non-Q wave MI in 2018 with 2 drug-eluting stents to the LAD and drug-eluting stent to the circumflex.    No epigastric pain or GI bleeding on aspirin.  No generalized myalgias on Lipitor.    No palpitations.    Urinating adequately on Flomax.    History of prostatectomy 2014 with a 3+3, but recurrence in 2020 and received radiation.  Low-level PSA he reports at 0.3 this past summer and sees radiation oncology again in November for recheck.    No muscle skeletal pain issues that affect mobility.  Previous biceps tear in July 2023.  Previous laminectomy L2-5 in 2017.    He did see dermatology about a month ago with treatment of recurrent squamous cell cancer.  He sees them again in 5 months.    He did see ophthalmology last summer and reports a normal eye exam.  No headache issues or new vision changes.    No swallowing difficulty.  No new cough.    PMHx:    Past Medical History:   Diagnosis Date    Adenomatous colon polyp     in 2014, 5 year follow up     "BPH (benign prostatic hyperplasia)     on flomax    CAD (coronary artery disease)     BMS LAD '01/ neg stress test '07/ no h/o MI    COPD (chronic obstructive pulmonary disease) (H)     mild/ former smoker    History of basal cell carcinoma of skin     yearly exam in Sept.    HTN (hypertension)     Hypercholesteremia     controlled on lipitor    Prostate cancer (H)     heriberto 3+3 on 4/14 bx.  No Tx yet, watchful waiting.     Spinal stenosis     severe L3-4, mod L4-5     PSHx:  No past surgical history on file.  Immunizations:   Immunization History   Administered Date(s) Administered    COVID-19 12+ (Pfizer) 09/24/2023, 09/24/2024    COVID-19 Bivalent 12+ (Pfizer) 09/30/2022    COVID-19 MONOVALENT 12+ (Pfizer) 02/19/2021, 03/12/2021, 09/29/2021    COVID-19 Monovalent 12+ (Pfizer 2022) 04/11/2022    Flu 65+ (Fluad) 10/15/2019    Flu, Unspecified 09/30/2009, 10/07/2010, 10/01/2020    Influenza (High Dose) Trivalent,PF (Fluzone) 09/25/2017, 10/24/2018    Influenza Vaccine 65+ (FLUAD) 11/02/2021    Influenza Vaccine 65+ (Fluzone HD) 10/18/2020, 10/17/2022, 11/12/2023    Pneumo Conj 13-V (2010&after) 04/27/2015    Pneumococcal 23 valent 01/01/2004, 06/23/2010    RSV Vaccine (Arexvy) 10/30/2023    TDAP (Adacel,Boostrix) 07/25/2012, 02/13/2023    Td,adult,historic,unspecified 03/05/2008    Zoster recombinant adjuvanted (SHINGRIX) 09/19/2019, 12/06/2019    Zoster vaccine, live 01/21/2009       ROS A comprehensive review of systems was performed and was otherwise negative    Medications, allergies, and problem list were reviewed and updated    Exam  /76   Pulse 96   Temp 97.8  F (36.6  C)   Resp 16   Ht 1.753 m (5' 9\")   Wt 99.3 kg (219 lb)   SpO2 98%   BMI 32.34 kg/m    Alert and oriented x 3 with good mood and affect.  Clock face drawing and word recall normal.  He does have some moderate kyphosis and mild unsteady gait but able to climb up on the exam table.  He is not parkinsonian.  Pupils and irises equal " and reactive.  Extraocular muscles intact.  No jaundice or conjunctivitis.  External ears and nose exam is normal.  He does have significant cerumen on the left but just moderate cerumen on the right.  Pharynx appears normal.  Teeth in good condition.  No cervical or supraclavicular or axillary adenopathy.  No JVD and no carotid bruits.  No thyromegaly or nodularity to inspection and palpation.  Lungs are clear to auscultation with good respiratory excursion.  Heart is regular with no murmur or gallop.  No premature beats.  Abdomen is nonobese nontender no hepatosplenomegaly or pulsatile abdominal mass.  Does have trace-+1 lower extremity edema bilaterally which is chronic.  He declined to take off her shoes.  Skin exam with multiple seborrheic keratosis.  Scarring of the scalp.  I did not see any new suspicious skin lesions.  Small actinic keratosis on the top of his right ear.    Assessment/Plan  1. Encounter for wellness examination in adult  Main focus for patient is to maintain mobility and independence.  He has good cognitive function.    See patient instructions.    Full code.    He will get his flu shot next week, otherwise up-to-date on immunizations.    2. Seasonal allergic rhinitis due to pollen  Controlled  - fluticasone (FLONASE) 50 MCG/ACT nasal spray; Spray 1 spray into both nostrils as needed for rhinitis or allergies.  Dispense: 48 g; Refill: 3    3. Coronary artery disease due to calcified coronary lesion  Asymptomatic.  Continue aspirin and atorvastatin  - Lipid Profile    History of diastolic dysfunction with suspected sleep apnea.  Lower extremity edema controlled on Lasix.    4. Prostate cancer (H)  PSA has remained low after radiation for recurrence after prostatectomy.  Follow-up in November with radiation oncology.  Urination adequate on Flomax    5. History of squamous cell carcinoma of skin  Continues to see dermatology every 6 months.    6. Encounter for therapeutic drug monitoring    -  CBC with platelets  - Comprehensive metabolic panel      Return in about 1 year (around 10/9/2025) for Adult wellness visit physical exam.   Patient Instructions   No change in treatment plan.    Continue to remain active on a daily basis.  Regular activity helps keep your body strong and healthy.    If you have any worsening shortness of breath or chest pain, get evaluated right away.    Follow-up with me in 1 year for adult wellness visit physical exam or as needed for issues.    He will have your prostate followed up in November as you stated.    See dermatology in approximately 6 months as planned for routine checkup.    Continue to see ophthalmology yearly in the summer for eye exam.    Alejandro Redd MD, MD        Current Outpatient Medications   Medication Sig Dispense Refill    ascorbic acid, vitamin C, (ASCORBIC ACID WITH HERMILA HIPS) 500 MG tablet [ASCORBIC ACID, VITAMIN C, (ASCORBIC ACID WITH HERMILA HIPS) 500 MG TABLET] Take 500 mg by mouth daily.      aspirin 81 MG EC tablet [ASPIRIN 81 MG EC TABLET] Take 81 mg by mouth every other day.             cholecalciferol, vitamin D3, (VITAMIN D3) 1,000 unit capsule [CHOLECALCIFEROL, VITAMIN D3, (VITAMIN D3) 1,000 UNIT CAPSULE] Take 1,000 Units by mouth daily.             fish oil-dha-epa 1,200-144-216 mg cap [FISH OIL-DHA-EPA 1,200-144-216 MG CAP] Take 1,000 mg by mouth daily .            fluticasone (FLONASE) 50 MCG/ACT nasal spray Spray 1 spray into both nostrils as needed for rhinitis or allergies. 48 g 3    FOLIC ACID/MULTIVITS-MIN/LUT (CENTRUM SILVER ORAL) [FOLIC ACID/MULTIVITS-MIN/LUT (CENTRUM SILVER ORAL)] Take 1 tablet by mouth daily.      furosemide (LASIX) 20 MG tablet TAKE 1 TABLET BY MOUTH DAILY 90 tablet 3    LIPITOR 20 MG tablet Take 1 tablet (20 mg) by mouth daily 90 tablet 3    nitroGLYcerin (NITROSTAT) 0.4 MG sublingual tablet [NITROGLYCERIN (NITROSTAT) 0.4 MG SL TABLET] DISSOLVE 1 TABLET UNDER THE TONGUE EVERY 5 MINUTES AS  NEEDED FOR CHEST PAIN.  MAX  OF 3 TABLETS IN 15 MINUTES. CALL 911 IF PAIN PERSISTS. 50 tablet 3    tamsulosin (FLOMAX) 0.4 MG capsule [TAMSULOSIN (FLOMAX) 0.4 MG CAP] TAKE 1 CAPSULE BY MOUTH AT  BEDTIME 90 capsule 2    zinc 50 mg Tab [ZINC 50 MG TAB] Take 1 tablet by mouth daily.       Allergies   Allergen Reactions    Nuts - Unspecified [Nuts] Itching     Other reaction(s): Tongue Swelling, Brazil nuts    Brazil Nut (Berthollefia Excelsa) Hives     Social History     Tobacco Use    Smoking status: Former     Passive exposure: Past    Smokeless tobacco: Never   Vaping Use    Vaping status: Never Used             Subjective   Ron is a 89 year old, presenting for the following:  Medicare Visit (Fasting.)        10/9/2024     7:54 AM   Additional Questions   Roomed by Minal VIRGEN   Accompanied by soumya         Health Care Directive  Patient has a Health Care Directive on file  Discussed advance care planning with patient.    HPI              10/8/2024   General Health   How would you rate your overall physical health? Good   Feel stress (tense, anxious, or unable to sleep) Not at all            10/8/2024   Nutrition   Diet: Regular (no restrictions)            10/8/2024   Exercise   Days per week of moderate/strenous exercise 4 days   Average minutes spent exercising at this level 30 min            10/8/2024   Social Factors   Frequency of gathering with friends or relatives Once a week   Worry food won't last until get money to buy more No   Food not last or not have enough money for food? No   Do you have housing? (Housing is defined as stable permanent housing and does not include staying ouside in a car, in a tent, in an abandoned building, in an overnight shelter, or couch-surfing.) Yes   Are you worried about losing your housing? No   Lack of transportation? No   Unable to get utilities (heat,electricity)? No            10/9/2024   Fall Risk   Reason Gait Speed Test Not Completed Patient declines             10/8/2024   Activities of Daily  Living- Home Safety   Needs help with the following daily activites None of the above   Safety concerns in the home None of the above            10/8/2024   Dental   Dentist two times every year? Yes            10/8/2024   Hearing Screening   Hearing concerns? None of the above            10/8/2024   Driving Risk Screening   Patient/family members have concerns about driving No            10/8/2024   General Alertness/Fatigue Screening   Have you been more tired than usual lately? No            10/8/2024   Urinary Incontinence Screening   Bothered by leaking urine in past 6 months No            10/8/2024   TB Screening   Were you born outside of the [place]? No            Today's PHQ-2 Score:       10/8/2024    11:31 AM   PHQ-2 ( 1999 Pfizer)   Q1: Little interest or pleasure in doing things 0   Q2: Feeling down, depressed or hopeless 0   PHQ-2 Score 0   Q1: Little interest or pleasure in doing things Not at all   Q2: Feeling down, depressed or hopeless Not at all   PHQ-2 Score 0           10/8/2024   Substance Use   Alcohol more than 3/day or more than 7/wk No   Do you have a current opioid prescription? No   How severe/bad is pain from 1 to 10? 1/10   Do you use any other substances recreationally? No        Social History     Tobacco Use    Smoking status: Former     Passive exposure: Past    Smokeless tobacco: Never   Vaping Use    Vaping status: Never Used             Reviewed and updated as needed this visit by Provider                      Current providers sharing in care for this patient include:  Patient Care Team:  Alejandro Redd MD as PCP - General (Internal Medicine)  Alejandro Redd MD as Assigned PCP    The following health maintenance items are reviewed in Epic and correct as of today:  Health Maintenance   Topic Date Due    ANNUAL REVIEW OF HM ORDERS  04/04/2023    INFLUENZA VACCINE (1) 09/01/2024    LIPID  10/02/2024    MEDICARE ANNUAL WELLNESS VISIT  10/02/2024    FALL RISK ASSESSMENT  10/09/2025     "ADVANCE CARE PLANNING  04/11/2029    DTAP/TDAP/TD IMMUNIZATION (3 - Td or Tdap) 02/13/2033    PHQ-2 (once per calendar year)  Completed    Pneumococcal Vaccine: 65+ Years  Completed    ZOSTER IMMUNIZATION  Completed    RSV VACCINE  Completed    COVID-19 Vaccine  Completed    HPV IMMUNIZATION  Aged Out    MENINGITIS IMMUNIZATION  Aged Out    RSV MONOCLONAL ANTIBODY  Aged Out            Objective    Exam  /76   Pulse 96   Temp 97.8  F (36.6  C)   Resp 16   Ht 1.753 m (5' 9\")   Wt 99.3 kg (219 lb)   SpO2 98%   BMI 32.34 kg/m     Estimated body mass index is 32.34 kg/m  as calculated from the following:    Height as of this encounter: 1.753 m (5' 9\").    Weight as of this encounter: 99.3 kg (219 lb).    Physical Exam           10/9/2024   Mini Cog   Clock Draw Score 2 Normal   3 Item Recall 3 objects recalled   Mini Cog Total Score 5                 Signed Electronically by: Alejandro Redd MD    "

## 2024-10-10 ENCOUNTER — TELEPHONE (OUTPATIENT)
Dept: INTERNAL MEDICINE | Facility: CLINIC | Age: 89
End: 2024-10-10
Payer: MEDICARE

## 2024-10-10 DIAGNOSIS — J30.1 SEASONAL ALLERGIC RHINITIS DUE TO POLLEN: ICD-10-CM

## 2024-10-10 RX ORDER — FLUTICASONE PROPIONATE 50 MCG
1 SPRAY, SUSPENSION (ML) NASAL DAILY
Qty: 48 G | Refills: 3 | Status: SHIPPED | OUTPATIENT
Start: 2024-10-10

## 2024-10-10 NOTE — TELEPHONE ENCOUNTER
Incoming fax from [facility] - Please clarify the FREQUENCY of administration for Flonase. Can send updated script to pharmacy with quantity for 90 day supply if necessary.

## 2024-12-11 DIAGNOSIS — C61 PROSTATE CANCER (H): ICD-10-CM

## 2024-12-11 RX ORDER — TAMSULOSIN HYDROCHLORIDE 0.4 MG/1
CAPSULE ORAL
Qty: 90 CAPSULE | Refills: 3 | Status: SHIPPED | OUTPATIENT
Start: 2024-12-11

## 2025-02-04 DIAGNOSIS — E78.00 HYPERCHOLESTEROLEMIA: ICD-10-CM

## 2025-02-05 RX ORDER — ATORVASTATIN CALCIUM 20 MG
20 TABLET ORAL DAILY
Qty: 90 TABLET | Refills: 2 | Status: SHIPPED | OUTPATIENT
Start: 2025-02-05

## 2025-03-04 ENCOUNTER — TELEPHONE (OUTPATIENT)
Dept: AUDIOLOGY | Facility: CLINIC | Age: OVER 89
End: 2025-03-04
Payer: MEDICARE

## 2025-03-04 NOTE — TELEPHONE ENCOUNTER
Returned patient's call; the left device's  wire is the broken one. I do not have an appropriately sized  in stock (needs 3.0 2xP for the left ear) and will need to order one. Patient will drop off both devices for checking at the [facility]  at his convenience, and he verbally agreed to the $100.00 replacement charge for the  on an out of warranty device. He will be called to  the devices once repaired.    Mary Madrid, [facility]A  [place] Licensed Audiologist [address] [place] [address]    Phone Message    May a detailed message be left on voicemail: yes     Reason for Call: Other: Pt said a wire on his hearing aid broke and he would like a call back to discuss.   Please call cell # [phone].  [place] location, thanks     Action Taken: Other: AUD    Travel Screening: Not Applicable     Date of Service:

## 2025-03-12 ENCOUNTER — TELEPHONE (OUTPATIENT)
Dept: AUDIOLOGY | Facility: CLINIC | Age: OVER 89
End: 2025-03-12

## 2025-03-12 ENCOUNTER — ALLIED HEALTH/NURSE VISIT (OUTPATIENT)
Dept: AUDIOLOGY | Facility: CLINIC | Age: OVER 89
End: 2025-03-12
Payer: COMMERCIAL

## 2025-03-12 DIAGNOSIS — H90.3 SENSORINEURAL HEARING LOSS, BILATERAL: Primary | ICD-10-CM

## 2025-03-12 PROCEDURE — V5014 HEARING AID REPAIR/MODIFYING: HCPCS | Performed by: AUDIOLOGIST

## 2025-03-12 NOTE — PROGRESS NOTES
In office  replacement on patient's right device (SN [id number]) at patient request; patient verbally approved cost of $100.00. Patient notified via phone that device would be available for  afternoon of 3-12-25. He expressed verbal understanding of this information and plan.    Mary Madrid, [facility]-A  [place] Licensed Audiologist [id number]

## 2025-03-12 NOTE — TELEPHONE ENCOUNTER
Both devices (out of warranty since 2021) brought in; patient had previously contacted me about replacing a broken  wire on the left device. Patient had attempted to glue the wire when it began to break; I'm unable to remove the  plug from the device to replace it with a new  as a result. Glue is evident on body of hearing aid and at broken end of enclosed  wire.     Device is still repairable (over 5 years old) per [employer] customer service. Patient agreed to $350.00 repair cost with additional 6-month repair warranty, and will be contacted for  when it is returned from repair.    He requested that the  wire on the right device also be changed, and verbally agreed to the cost of $100.00 for that in-office repair. It will be available for  at the [facility]  this afternoon (3-12-25). Patient expressed verbal understanding of this information and plan.    Mary Madrid, [facility]-A  [place] Licensed Audiologist [phone]

## 2025-03-24 ENCOUNTER — ALLIED HEALTH/NURSE VISIT (OUTPATIENT)
Dept: AUDIOLOGY | Facility: CLINIC | Age: OVER 89
End: 2025-03-24
Payer: COMMERCIAL

## 2025-03-24 DIAGNOSIS — H90.3 SENSORINEURAL HEARING LOSS, BILATERAL: Primary | ICD-10-CM

## 2025-03-24 PROCEDURE — V5014 HEARING AID REPAIR/MODIFYING: HCPCS | Performed by: AUDIOLOGIST

## 2025-03-24 NOTE — PROGRESS NOTES
Out of warranty hearing aid repair on a device more than five years old completed; device available for patient  at the [facility] .     Patient cost $350.00; new warranty on device with serial number [id number] will  25. Patient was contacted via phone call for ; he expressed verbal understanding of this information.    Mary Madrid, [facility]-A  [place] Licensed Audiologist [phone]

## 2025-07-26 DIAGNOSIS — R60.0 BILATERAL LOWER EXTREMITY EDEMA: ICD-10-CM

## 2025-07-28 RX ORDER — FUROSEMIDE 20 MG/1
20 TABLET ORAL DAILY
Qty: 90 TABLET | Refills: 3 | Status: SHIPPED | OUTPATIENT
Start: 2025-07-28